# Patient Record
Sex: MALE | Race: WHITE | NOT HISPANIC OR LATINO | Employment: OTHER | URBAN - METROPOLITAN AREA
[De-identification: names, ages, dates, MRNs, and addresses within clinical notes are randomized per-mention and may not be internally consistent; named-entity substitution may affect disease eponyms.]

---

## 2017-03-10 DIAGNOSIS — E11.9 TYPE 2 DIABETES MELLITUS WITHOUT COMPLICATION: ICD-10-CM

## 2017-04-07 DIAGNOSIS — E11.9 TYPE 2 DIABETES MELLITUS WITHOUT COMPLICATION: ICD-10-CM

## 2017-06-25 ENCOUNTER — HOSPITAL ENCOUNTER (INPATIENT)
Facility: HOSPITAL | Age: 77
LOS: 2 days | Discharge: HOME | DRG: 065 | End: 2017-06-27
Attending: EMERGENCY MEDICINE | Admitting: SPECIALIST
Payer: MEDICARE

## 2017-06-25 DIAGNOSIS — R53.1 ACUTE LEFT-SIDED WEAKNESS: Primary | ICD-10-CM

## 2017-06-25 DIAGNOSIS — R53.1 WEAKNESS: ICD-10-CM

## 2017-06-25 DIAGNOSIS — I63.9 ACUTE CVA (CEREBROVASCULAR ACCIDENT): ICD-10-CM

## 2017-06-25 DIAGNOSIS — I63.50 CEREBRAL INFARCTION DUE TO UNSPECIFIED OCCLUSION OR STENOSIS OF UNSPECIFIED CEREBRAL ARTERY: ICD-10-CM

## 2017-06-25 LAB
ALBUMIN SERPL BCP-MCNC: 3.8 G/DL
ALP SERPL-CCNC: 109 U/L
ALT SERPL W/O P-5'-P-CCNC: 13 U/L
ANION GAP SERPL CALC-SCNC: 9 MMOL/L
APTT BLDCRRT: 29.1 SEC
AST SERPL-CCNC: 13 U/L
BASOPHILS # BLD AUTO: 0.04 K/UL
BASOPHILS NFR BLD: 0.8 %
BILIRUB SERPL-MCNC: 0.5 MG/DL
BILIRUB UR QL STRIP: NEGATIVE
BUN SERPL-MCNC: 20 MG/DL
CALCIUM SERPL-MCNC: 9.1 MG/DL
CHLORIDE SERPL-SCNC: 106 MMOL/L
CLARITY UR: CLEAR
CO2 SERPL-SCNC: 26 MMOL/L
COLOR UR: YELLOW
CREAT SERPL-MCNC: 1.3 MG/DL
DIFFERENTIAL METHOD: ABNORMAL
EOSINOPHIL # BLD AUTO: 0.3 K/UL
EOSINOPHIL NFR BLD: 5.9 %
ERYTHROCYTE [DISTWIDTH] IN BLOOD BY AUTOMATED COUNT: 13.1 %
EST. GFR  (AFRICAN AMERICAN): >60 ML/MIN/1.73 M^2
EST. GFR  (NON AFRICAN AMERICAN): 52 ML/MIN/1.73 M^2
GLUCOSE SERPL-MCNC: 250 MG/DL
GLUCOSE UR QL STRIP: ABNORMAL
HCT VFR BLD AUTO: 37.8 %
HGB BLD-MCNC: 13.2 G/DL
HGB UR QL STRIP: NEGATIVE
INR PPP: 1
KETONES UR QL STRIP: NEGATIVE
LEUKOCYTE ESTERASE UR QL STRIP: NEGATIVE
LYMPHOCYTES # BLD AUTO: 1 K/UL
LYMPHOCYTES NFR BLD: 19.7 %
MAGNESIUM SERPL-MCNC: 2 MG/DL
MCH RBC QN AUTO: 30.4 PG
MCHC RBC AUTO-ENTMCNC: 34.9 %
MCV RBC AUTO: 87 FL
MONOCYTES # BLD AUTO: 0.6 K/UL
MONOCYTES NFR BLD: 10.4 %
NEUTROPHILS # BLD AUTO: 3.3 K/UL
NEUTROPHILS NFR BLD: 63.2 %
NITRITE UR QL STRIP: NEGATIVE
PH UR STRIP: 7 [PH] (ref 5–8)
PHOSPHATE SERPL-MCNC: 2.8 MG/DL
PLATELET # BLD AUTO: 222 K/UL
PMV BLD AUTO: 9.7 FL
POTASSIUM SERPL-SCNC: 4.3 MMOL/L
PROT SERPL-MCNC: 7.3 G/DL
PROT UR QL STRIP: NEGATIVE
PROTHROMBIN TIME: 10.6 SEC
RBC # BLD AUTO: 4.34 M/UL
SODIUM SERPL-SCNC: 141 MMOL/L
SP GR UR STRIP: 1.01 (ref 1–1.03)
TROPONIN I SERPL DL<=0.01 NG/ML-MCNC: 0.01 NG/ML
TROPONIN I SERPL DL<=0.01 NG/ML-MCNC: 0.03 NG/ML
URN SPEC COLLECT METH UR: ABNORMAL
UROBILINOGEN UR STRIP-ACNC: NEGATIVE EU/DL
WBC # BLD AUTO: 5.28 K/UL

## 2017-06-25 PROCEDURE — 36415 COLL VENOUS BLD VENIPUNCTURE: CPT

## 2017-06-25 PROCEDURE — 83036 HEMOGLOBIN GLYCOSYLATED A1C: CPT

## 2017-06-25 PROCEDURE — 94640 AIRWAY INHALATION TREATMENT: CPT

## 2017-06-25 PROCEDURE — 25000003 PHARM REV CODE 250: Performed by: NURSE PRACTITIONER

## 2017-06-25 PROCEDURE — 85025 COMPLETE CBC W/AUTO DIFF WBC: CPT

## 2017-06-25 PROCEDURE — 93010 ELECTROCARDIOGRAM REPORT: CPT | Mod: S$GLB,,, | Performed by: INTERNAL MEDICINE

## 2017-06-25 PROCEDURE — 93005 ELECTROCARDIOGRAM TRACING: CPT

## 2017-06-25 PROCEDURE — 85610 PROTHROMBIN TIME: CPT

## 2017-06-25 PROCEDURE — 84100 ASSAY OF PHOSPHORUS: CPT

## 2017-06-25 PROCEDURE — 83735 ASSAY OF MAGNESIUM: CPT

## 2017-06-25 PROCEDURE — 63600175 PHARM REV CODE 636 W HCPCS: Performed by: NURSE PRACTITIONER

## 2017-06-25 PROCEDURE — 25000003 PHARM REV CODE 250: Performed by: EMERGENCY MEDICINE

## 2017-06-25 PROCEDURE — 81003 URINALYSIS AUTO W/O SCOPE: CPT

## 2017-06-25 PROCEDURE — 99285 EMERGENCY DEPT VISIT HI MDM: CPT | Mod: 25

## 2017-06-25 PROCEDURE — 85730 THROMBOPLASTIN TIME PARTIAL: CPT

## 2017-06-25 PROCEDURE — 80053 COMPREHEN METABOLIC PANEL: CPT

## 2017-06-25 PROCEDURE — 84484 ASSAY OF TROPONIN QUANT: CPT | Mod: 91

## 2017-06-25 PROCEDURE — 84484 ASSAY OF TROPONIN QUANT: CPT

## 2017-06-25 PROCEDURE — 11000001 HC ACUTE MED/SURG PRIVATE ROOM

## 2017-06-25 RX ORDER — CLONAZEPAM 1 MG/1
1 TABLET ORAL NIGHTLY
Status: DISCONTINUED | OUTPATIENT
Start: 2017-06-25 | End: 2017-06-25

## 2017-06-25 RX ORDER — MIRTAZAPINE 15 MG/1
15 TABLET, FILM COATED ORAL NIGHTLY
COMMUNITY

## 2017-06-25 RX ORDER — ASPIRIN 81 MG/1
81 TABLET ORAL DAILY
Status: DISCONTINUED | OUTPATIENT
Start: 2017-06-26 | End: 2017-06-27 | Stop reason: HOSPADM

## 2017-06-25 RX ORDER — INSULIN ASPART 100 [IU]/ML
0-5 INJECTION, SOLUTION INTRAVENOUS; SUBCUTANEOUS
Status: DISCONTINUED | OUTPATIENT
Start: 2017-06-25 | End: 2017-06-27 | Stop reason: HOSPADM

## 2017-06-25 RX ORDER — METHOCARBAMOL 750 MG/1
750 TABLET, FILM COATED ORAL NIGHTLY
COMMUNITY

## 2017-06-25 RX ORDER — LOSARTAN POTASSIUM 50 MG/1
100 TABLET ORAL DAILY
Status: DISCONTINUED | OUTPATIENT
Start: 2017-06-26 | End: 2017-06-26

## 2017-06-25 RX ORDER — ACETAMINOPHEN 325 MG/1
650 TABLET ORAL EVERY 6 HOURS PRN
Status: DISCONTINUED | OUTPATIENT
Start: 2017-06-25 | End: 2017-06-27 | Stop reason: HOSPADM

## 2017-06-25 RX ORDER — IBUPROFEN 200 MG
24 TABLET ORAL
Status: DISCONTINUED | OUTPATIENT
Start: 2017-06-25 | End: 2017-06-27 | Stop reason: HOSPADM

## 2017-06-25 RX ORDER — AMLODIPINE BESYLATE 5 MG/1
5 TABLET ORAL DAILY
Status: DISCONTINUED | OUTPATIENT
Start: 2017-06-26 | End: 2017-06-26

## 2017-06-25 RX ORDER — HEPARIN SODIUM 5000 [USP'U]/ML
5000 INJECTION, SOLUTION INTRAVENOUS; SUBCUTANEOUS EVERY 8 HOURS
Status: DISCONTINUED | OUTPATIENT
Start: 2017-06-25 | End: 2017-06-27 | Stop reason: HOSPADM

## 2017-06-25 RX ORDER — IBUPROFEN 200 MG
16 TABLET ORAL
Status: DISCONTINUED | OUTPATIENT
Start: 2017-06-25 | End: 2017-06-27 | Stop reason: HOSPADM

## 2017-06-25 RX ORDER — GLUCAGON 1 MG
1 KIT INJECTION
Status: DISCONTINUED | OUTPATIENT
Start: 2017-06-25 | End: 2017-06-27 | Stop reason: HOSPADM

## 2017-06-25 RX ORDER — GUAIFENESIN 600 MG/1
600 TABLET, EXTENDED RELEASE ORAL 2 TIMES DAILY
Status: DISCONTINUED | OUTPATIENT
Start: 2017-06-25 | End: 2017-06-27 | Stop reason: HOSPADM

## 2017-06-25 RX ORDER — IPRATROPIUM BROMIDE AND ALBUTEROL SULFATE 2.5; .5 MG/3ML; MG/3ML
3 SOLUTION RESPIRATORY (INHALATION) EVERY 4 HOURS PRN
Status: DISCONTINUED | OUTPATIENT
Start: 2017-06-25 | End: 2017-06-27 | Stop reason: HOSPADM

## 2017-06-25 RX ORDER — ARFORMOTEROL TARTRATE 15 UG/2ML
15 SOLUTION RESPIRATORY (INHALATION) 2 TIMES DAILY
Status: DISCONTINUED | OUTPATIENT
Start: 2017-06-25 | End: 2017-06-27 | Stop reason: HOSPADM

## 2017-06-25 RX ADMIN — ARFORMOTEROL TARTRATE 15 MCG: 15 SOLUTION RESPIRATORY (INHALATION) at 07:06

## 2017-06-25 RX ADMIN — HEPARIN SODIUM 5000 UNITS: 5000 INJECTION, SOLUTION INTRAVENOUS; SUBCUTANEOUS at 09:06

## 2017-06-25 RX ADMIN — PANTOPRAZOLE SODIUM 600 MG: 40 TABLET, DELAYED RELEASE ORAL at 09:06

## 2017-06-25 RX ADMIN — ACETAMINOPHEN 650 MG: 325 TABLET ORAL at 09:06

## 2017-06-25 RX ADMIN — HUMAN INSULIN 20 UNITS: 100 INJECTION, SUSPENSION SUBCUTANEOUS at 09:06

## 2017-06-25 NOTE — ED NOTES
Pt resting in ER stretcher, aaox4, rr e/u, NAD noted. Pt remains on cardiac monitor with vss noted. Bed low and locked, call light in reach, side rails up x2. Friend at bedside.  Provided with urinal for specimen collection.  Pt verbalized understanding of status and POC; denies further needs. Will continue to monitor.

## 2017-06-25 NOTE — PROGRESS NOTES
Pt arrived in stable condition accompanied by nurse.  Bedside report given.  Patient IV patent.  Pt has no complaints at this time.  Heart monitor put on.  Will cont to monitor.

## 2017-06-25 NOTE — ASSESSMENT & PLAN NOTE
Neuro check Q 4 hours.   CT of head showed no acute intracranial hemorrhage or focal brain parenchymal abnormality is identified.  MRI of head   History of Right side CVA   Check 2 D Echo   Bilateral Carotid U/S  Consult Neurology

## 2017-06-25 NOTE — H&P
"[facility] Hospital Medicine  History & Physical    Patient Name: Mikael Cancino  MRN: [id number]  Admission Date: 6/25/2017  Attending Physician: Dilan Porras Jr., MD   Primary Care Provider: BLANCA GIRON MD         Patient information was obtained from patient and ER records.     Subjective:     Principal Problem:Acute left-sided weakness    Chief Complaint:   Chief Complaint   Patient presents with    Weakness     Pt states, "I fell because my left leg went weak and couldn't hold my weight."        HPI: Mr Cancino is a 76 year old male with PMHx of COPD, CVA in 7/2015 with Rt side weakness,  Asbestosis, HTN, DM, former smoker and intolerance to Statins (constipation). He presented to [facility] Emergency Room with complaints left side weakness. Patient fell while getting up out of bed this morning about 9:30 am. Pt states his LLE felt "weak and gave out". He fell on his right hip.   Denies associated symptoms of chest pain, increase shortness of breath, dizziness, palpitations, nausea, vomiting or diuresis.  CT of head showed atrophy present.  Old right basal ganglia infarct.  No acute intracranial hemorrhage or focal brain parenchymal abnormality is identified, calvarium is intact. Rt hip X ray showed no acute bony or joint abnormality. EKG today: Normal sinus rhythm.    Past Medical History:   Diagnosis Date    Allergy     Asbestosis     Asthma     COPD (chronic obstructive pulmonary disease)     Diabetes mellitus     Diabetes mellitus, type 2     Hypertension     Neuropathic pain     Stroke 07/2015       Past Surgical History:   Procedure Laterality Date    BACK SURGERY      CATARACT EXTRACTION W/ INTRAOCULAR LENS IMPLANT         Review of patient's allergies indicates:   Allergen Reactions    Cholesterol analogues Other (See Comments)     constapation    Latex, natural rubber Blisters    Statins-hmg-coa reductase inhibitors      constipation       No current " facility-administered medications on file prior to encounter.      Current Outpatient Prescriptions on File Prior to Encounter   Medication Sig    albuterol (PROVENTIL) 2.5 mg /3 mL (0.083 %) nebulizer solution Take 3 mLs (2.5 mg total) by nebulization every 4 (four) hours as needed for Shortness of Breath.    amlodipine (NORVASC) 10 MG tablet Take 10 mg by mouth once daily.     aspirin (ECOTRIN) 81 MG EC tablet Take 1 tablet (81 mg total) by mouth once daily.    budesonide-formoterol 160-4.5 mcg (SYMBICORT) 160-4.5 mcg/actuation HFAA Inhale 2 puffs into the lungs every 12 (twelve) hours. Wash out mouth after using    gabapentin (NEURONTIN) 300 MG capsule Take 600 mg by mouth 3 (three) times daily. 2 tablets by mouth 3 times daily    insulin NPH (NOVOLIN N) 100 unit/mL injection 45 units daily    losartan (COZAAR) 100 MG tablet Take 100 mg by mouth every evening.     tiotropium (SPIRIVA) 18 mcg inhalation capsule Inhale 1 capsule (18 mcg total) into the lungs Daily.    acetaminophen (TYLENOL) 500 mg Cap     atorvastatin (LIPITOR) 40 MG tablet Take 1 tablet (40 mg total) by mouth once daily. Call PCP for refills (Patient taking differently: Take 80 mg by mouth once daily. Call PCP for refills)    benzonatate (TESSALON) 100 MG capsule Take 1 capsule (100 mg total) by mouth 3 (three) times daily as needed for Cough.    clonazepam (KLONOPIN) 1 MG tablet Take 1 mg by mouth every evening.    fluticasone (FLONASE) 50 mcg/actuation nasal spray 2 sprays by Nasal route.    insulin aspart (NOVOLOG) 100 unit/mL injection 5 units  Sq  Before dinner    metformin (GLUCOPHAGE) 500 MG tablet Take 500 mg by mouth once daily.     Family History     Problem Relation (Age of Onset)    Cancer Sister    Emphysema Sister    Heart failure Mother        Social History Main Topics    Smoking status: Former Smoker     Packs/day: 1.00     Types: Cigarettes     Quit date: 2/5/1998    Smokeless tobacco: Never Used    Alcohol use  0.0 oz/week      Comment: Beer but very seldom    Drug use: No    Sexual activity: Yes     Birth control/ protection: None     Review of Systems   Constitutional: Negative for activity change, appetite change, chills, fatigue and fever.   HENT: Negative for congestion, postnasal drip and rhinorrhea.    Respiratory: Positive for shortness of breath (chronic). Negative for apnea, cough, choking, chest tightness, wheezing and stridor.    Cardiovascular: Negative for chest pain, palpitations and leg swelling.   Gastrointestinal: Negative for abdominal distention, diarrhea and nausea.   Genitourinary: Negative.    Musculoskeletal:        Left leg weakness   Rt hip pain    Skin: Negative for color change, pallor, rash and wound.   Neurological: Positive for weakness (Left leg ). Negative for dizziness, seizures, syncope, speech difficulty, light-headedness and headaches.   Psychiatric/Behavioral: Negative for agitation, behavioral problems and suicidal ideas.     Objective:     Vital Signs (Most Recent):  Temp: 97.7 °F (36.5 °C) (06/25/17 1522)  Pulse: (!) 57 (06/25/17 1522)  Resp: 14 (06/25/17 1522)  BP: (!) 157/84 (06/25/17 1525)  SpO2: 98 % (06/25/17 1522) Vital Signs (24h Range):  Temp:  [97.6 °F (36.4 °C)-97.7 °F (36.5 °C)] 97.7 °F (36.5 °C)  Pulse:  [56-77] 57  Resp:  [12-20] 14  SpO2:  [96 %-100 %] 98 %  BP: (154-168)/(66-84) 157/84     Weight: 88 kg (194 lb)  Body mass index is 27.06 kg/m².    Physical Exam   Constitutional: He is oriented to person, place, and time. He appears well-developed and well-nourished.   HENT:   Head: Normocephalic and atraumatic.   Eyes: Right eye exhibits no discharge. Left eye exhibits no discharge.   Neck: No JVD present.   Cardiovascular: Exam reveals no gallop and no friction rub.    No murmur heard.  Pulmonary/Chest: No respiratory distress. He has rales (faint rales Lt posterior side ). He exhibits no tenderness.   Abdominal: He exhibits no distension.   Musculoskeletal: He  exhibits no edema, tenderness or deformity.   Mild leg leg weakness   Neurological: He is alert and oriented to person, place, and time.   Skin: Skin is warm and dry.   Nursing note and vitals reviewed.       Significant Labs:   CBC:   Recent Labs  Lab 06/25/17  1245   WBC 5.28   HGB 13.2*   HCT 37.8*        CMP:   Recent Labs  Lab 06/25/17  1245      K 4.3      CO2 26   *   BUN 20   CREATININE 1.3   CALCIUM 9.1   PROT 7.3   ALBUMIN 3.8   BILITOT 0.5   ALKPHOS 109   AST 13   ALT 13   ANIONGAP 9   EGFRNONAA 52*     Imaging Results          MRI Brain Without Contrast (In process)                X-Ray Hip 2 or 3 views Right (Final result)  Result time 06/25/17 15:37:07    Final result by Nicolasa Garcia MD (Timothy) (06/25/17 15:37:07)                 Impression:         No acute bony changes      Electronically signed by: NICOLASA GARCIA MD  Date:     06/25/17  Time:    15:37              Narrative:    Right hip, 3 views    Clinical History:  Right hip pain after fall    Findings:     There is  no acute bony or joint abnormality. No acute fracture or dislocation.                             CT Head Without Contrast (Final result)  Result time 06/25/17 13:29:57    Final result by Nicolasa Garcia MD (Timothy) (06/25/17 13:29:57)                 Impression:         No acute intracranial abnormality    all ct exams at this facility use dose modulation, iterative reconstruction, and /or weight based dosing to reduce radiation dose to low as reasonably achievable.      Electronically signed by: NICOLASA GARCIA MD  Date:     06/25/17  Time:    13:29              Narrative:    Exam: Noncontrast CT head    History:    Weakness    Findings: Atrophy is present.  Old right basal ganglia infarct.  No acute intracranial hemorrhage or focal brain parenchymal abnormality is identified. Calvarium is intact.     No change compared to 07/23/2015.                            Assessment/Plan:     * Acute left-sided  weakness    Neuro check Q 4 hours.   CT of head showed no acute intracranial hemorrhage or focal brain parenchymal abnormality is identified.  MRI of head   History of Right side CVA   Check 2 D Echo   Bilateral Carotid U/S  Consult Neurology             Diabetes mellitus    Accu check ACHS, low dose siding scale  Restart NPH 30 units QHS   HA1C           Chronic obstructive pulmonary disease    Duo Nebs Q 4 hours PRN, SOB or wheezing   Mucinex 600 mg BID   Start Brovana, he is on Spiriva at home             Essential (primary) hypertension    Restart home medications             VTE Risk Mitigation         Ordered     heparin (porcine) injection 5,000 Units  Every 8 hours     Route:  Subcutaneous        06/25/17 1425     Medium Risk of VTE  Once      06/25/17 1604        Luis Fernando Clifton NP  Department of Hospital Medicine   [facility]     I have seen and examined the patient at bedside. I reviewed the notes, assessments, and/or procedures performed by Luis Fernando Clifton, I concur with his documentation of Mikael Cancino.    Patient with hx of CVA, now admitted with left sided weakness.   Pt was on asa at home.   MRI, carotid ultrasound and echo with bubble study ordered.   Neurology consulted.   Pt/OT.   Supportive care.     Dilan Porras MD

## 2017-06-25 NOTE — SUBJECTIVE & OBJECTIVE
Past Medical History:   Diagnosis Date    Allergy     Asbestosis     Asthma     COPD (chronic obstructive pulmonary disease)     Diabetes mellitus     Diabetes mellitus, type 2     Hypertension     Neuropathic pain     Stroke 07/2015       Past Surgical History:   Procedure Laterality Date    BACK SURGERY      CATARACT EXTRACTION W/ INTRAOCULAR LENS IMPLANT         Review of patient's allergies indicates:   Allergen Reactions    Cholesterol analogues Other (See Comments)     constapation    Latex, natural rubber Blisters    Statins-hmg-coa reductase inhibitors      constipation       No current facility-administered medications on file prior to encounter.      Current Outpatient Prescriptions on File Prior to Encounter   Medication Sig    albuterol (PROVENTIL) 2.5 mg /3 mL (0.083 %) nebulizer solution Take 3 mLs (2.5 mg total) by nebulization every 4 (four) hours as needed for Shortness of Breath.    amlodipine (NORVASC) 10 MG tablet Take 10 mg by mouth once daily.     aspirin (ECOTRIN) 81 MG EC tablet Take 1 tablet (81 mg total) by mouth once daily.    budesonide-formoterol 160-4.5 mcg (SYMBICORT) 160-4.5 mcg/actuation HFAA Inhale 2 puffs into the lungs every 12 (twelve) hours. Wash out mouth after using    gabapentin (NEURONTIN) 300 MG capsule Take 600 mg by mouth 3 (three) times daily. 2 tablets by mouth 3 times daily    insulin NPH (NOVOLIN N) 100 unit/mL injection 45 units daily    losartan (COZAAR) 100 MG tablet Take 100 mg by mouth every evening.     tiotropium (SPIRIVA) 18 mcg inhalation capsule Inhale 1 capsule (18 mcg total) into the lungs Daily.    acetaminophen (TYLENOL) 500 mg Cap     atorvastatin (LIPITOR) 40 MG tablet Take 1 tablet (40 mg total) by mouth once daily. Call PCP for refills (Patient taking differently: Take 80 mg by mouth once daily. Call PCP for refills)    benzonatate (TESSALON) 100 MG capsule Take 1 capsule (100 mg total) by mouth 3 (three) times daily as  needed for Cough.    clonazepam (KLONOPIN) 1 MG tablet Take 1 mg by mouth every evening.    fluticasone (FLONASE) 50 mcg/actuation nasal spray 2 sprays by Nasal route.    insulin aspart (NOVOLOG) 100 unit/mL injection 5 units  Sq  Before dinner    metformin (GLUCOPHAGE) 500 MG tablet Take 500 mg by mouth once daily.     Family History     Problem Relation (Age of Onset)    Cancer Sister    Emphysema Sister    Heart failure Mother        Social History Main Topics    Smoking status: Former Smoker     Packs/day: 1.00     Types: Cigarettes     Quit date: 2/5/1998    Smokeless tobacco: Never Used    Alcohol use 0.0 oz/week      Comment: Beer but very seldom    Drug use: No    Sexual activity: Yes     Birth control/ protection: None     Review of Systems   Constitutional: Negative for activity change, appetite change, chills, fatigue and fever.   HENT: Negative for congestion, postnasal drip and rhinorrhea.    Respiratory: Positive for shortness of breath (chronic). Negative for apnea, cough, choking, chest tightness, wheezing and stridor.    Cardiovascular: Negative for chest pain, palpitations and leg swelling.   Gastrointestinal: Negative for abdominal distention, diarrhea and nausea.   Genitourinary: Negative.    Musculoskeletal:        Left leg weakness   Rt hip pain    Skin: Negative for color change, pallor, rash and wound.   Neurological: Positive for weakness (Left leg ). Negative for dizziness, seizures, syncope, speech difficulty, light-headedness and headaches.   Psychiatric/Behavioral: Negative for agitation, behavioral problems and suicidal ideas.     Objective:     Vital Signs (Most Recent):  Temp: 97.7 °F (36.5 °C) (06/25/17 1522)  Pulse: (!) 57 (06/25/17 1522)  Resp: 14 (06/25/17 1522)  BP: (!) 157/84 (06/25/17 1525)  SpO2: 98 % (06/25/17 1522) Vital Signs (24h Range):  Temp:  [97.6 °F (36.4 °C)-97.7 °F (36.5 °C)] 97.7 °F (36.5 °C)  Pulse:  [56-77] 57  Resp:  [12-20] 14  SpO2:  [96 %-100 %] 98  %  BP: (154-168)/(66-84) 157/84     Weight: 88 kg (194 lb)  Body mass index is 27.06 kg/m².    Physical Exam   Constitutional: He is oriented to person, place, and time. He appears well-developed and well-nourished.   HENT:   Head: Normocephalic and atraumatic.   Eyes: Right eye exhibits no discharge. Left eye exhibits no discharge.   Neck: No JVD present.   Cardiovascular: Exam reveals no gallop and no friction rub.    No murmur heard.  Pulmonary/Chest: No respiratory distress. He has rales (faint rales Lt posterior side ). He exhibits no tenderness.   Abdominal: He exhibits no distension.   Musculoskeletal: He exhibits no edema, tenderness or deformity.   Mild leg leg weakness   Neurological: He is alert and oriented to person, place, and time.   Skin: Skin is warm and dry.   Nursing note and vitals reviewed.       Significant Labs:   CBC:   Recent Labs  Lab 06/25/17  1245   WBC 5.28   HGB 13.2*   HCT 37.8*        CMP:   Recent Labs  Lab 06/25/17  1245      K 4.3      CO2 26   *   BUN 20   CREATININE 1.3   CALCIUM 9.1   PROT 7.3   ALBUMIN 3.8   BILITOT 0.5   ALKPHOS 109   AST 13   ALT 13   ANIONGAP 9   EGFRNONAA 52*     Imaging Results          MRI Brain Without Contrast (In process)                X-Ray Hip 2 or 3 views Right (Final result)  Result time 06/25/17 15:37:07    Final result by Nicolasa Garcia MD (Timothy) (06/25/17 15:37:07)                 Impression:         No acute bony changes      Electronically signed by: NICOLASA GARCIA MD  Date:     06/25/17  Time:    15:37              Narrative:    Right hip, 3 views    Clinical History:  Right hip pain after fall    Findings:     There is  no acute bony or joint abnormality. No acute fracture or dislocation.                             CT Head Without Contrast (Final result)  Result time 06/25/17 13:29:57    Final result by Nicolasa Garcia MD (Timothy) (06/25/17 13:29:57)                 Impression:         No acute intracranial  abnormality    all ct exams at this facility use dose modulation, iterative reconstruction, and /or weight based dosing to reduce radiation dose to low as reasonably achievable.      Electronically signed by: LUBNA GARCIA MD  Date:     06/25/17  Time:    13:29              Narrative:    Exam: Noncontrast CT head    History:    Weakness    Findings: Atrophy is present.  Old right basal ganglia infarct.  No acute intracranial hemorrhage or focal brain parenchymal abnormality is identified. Calvarium is intact.     No change compared to 07/23/2015.

## 2017-06-25 NOTE — ASSESSMENT & PLAN NOTE
Duo Nebs Q 4 hours PRN, SOB or wheezing   Mucinex 600 mg BID   Start Brovana, he is on Spiriva at home

## 2017-06-25 NOTE — ED PROVIDER NOTES
"SCRIBE #1 NOTE: I, Corinne Woodrow, am scribing for, and in the presence of, David Cobb MD. I have scribed the entire note.      History      Chief Complaint   Patient presents with    Weakness     Pt states, "I fell because my left leg went weak and couldn't hold my weight."       Review of patient's allergies indicates:   Allergen Reactions    Cholesterol analogues Other (See Comments)     constapation    Statins-hmg-coa reductase inhibitors      constipation        HPI   HPI    6/25/2017, 12:23 PM   History obtained from the patient      History of Present Illness: Mikael Cancino is a 76 y.o. male patient who presents to the Emergency Department for weakness which onset gradually. Symptoms are constant and moderate in severity. Pt fell this morning while getting out of bed. Pt states his LLE felt "weak and gave out". Pt's friend reports pt was not feeling well all day yesterday. No mitigating or exacerbating factors reported. No associated sxs reported. Patient denies any fever, chills, CP, palpitations, SOB, N/V, head injury/trauma, HA, dizziness, numbness, LOC, and all other sxs at this time. No prior Tx reported. Pt has Hx of COPD and stroke. No further complaints or concerns at this time.       Arrival mode: Personal vehicle      PCP: BLANCA GIRON MD       Past Medical History:  Past Medical History:   Diagnosis Date    Allergy     Asbestosis     Asthma     COPD (chronic obstructive pulmonary disease)     Diabetes mellitus     Diabetes mellitus, type 2     Hypertension     Neuropathic pain     Stroke 07/2015       Past Surgical History:  Past Surgical History:   Procedure Laterality Date    BACK SURGERY      CATARACT EXTRACTION W/ INTRAOCULAR LENS IMPLANT           Family History:  Family History   Problem Relation Age of Onset    Emphysema Sister     Cancer Sister     Heart failure Mother        Social History:  Social History     Social History Main Topics    Smoking status: " Former Smoker     Packs/day: 1.00     Types: Cigarettes     Quit date: 2/5/1998    Smokeless tobacco: Never Used    Alcohol use 0.0 oz/week      Comment: Beer but very seldom    Drug use: No    Sexual activity: Yes     Birth control/ protection: None       ROS   Review of Systems   Constitutional: Negative for chills and fever.   Respiratory: Negative for cough and shortness of breath.    Cardiovascular: Negative for chest pain and palpitations.   Gastrointestinal: Negative for abdominal pain, diarrhea, nausea and vomiting.   Musculoskeletal: Negative for back pain, neck pain and neck stiffness.        (+) fall  (-) head injury/trauma   Skin: Negative for rash and wound.   Neurological: Positive for weakness (L-sided). Negative for dizziness, light-headedness, numbness and headaches.        (-) LOC   All other systems reviewed and are negative.    Physical Exam      Initial Vitals [06/25/17 1208]   BP Pulse Resp Temp SpO2   (!) 160/78 77 20 97.6 °F (36.4 °C) 96 %      MAP       105.33          Physical Exam  Nursing Notes and Vital Signs Reviewed.  Constitutional: Patient is in no apparent distress. Well-developed and well-nourished.  Head: Atraumatic. Normocephalic.  Eyes: PERRL. EOM intact. Conjunctivae are not pale. No scleral icterus.  ENT: Mucous membranes are moist. Oropharynx is clear and symmetric.    Neck: Supple. Full ROM. No lymphadenopathy.  Cardiovascular: Regular rate. Regular rhythm. No murmurs, rubs, or gallops. Distal pulses are 2+ and symmetric.  Pulmonary/Chest: No respiratory distress. Clear to auscultation bilaterally. No wheezing, rales, or rhonchi.  Abdominal: Soft and non-distended.  There is no tenderness.  No rebound, guarding, or rigidity.   Musculoskeletal: Moves all extremities. No obvious deformities. No edema. No calf tenderness.  Skin: Warm and dry.  Neurological: Patient is alert and oriented to person, place and time. Pupils ERRL and EOM normal. Cranial nerves II-XII are intact.  "Strength is full bilaterally; it is equal and 5/5 in bilateral upper and lower extremities. There is no pronator drift of outstretched arms. Drifting of LLE. Speech is clear and normal. No acute focal neurological deficits noted.  Psychiatric: Normal affect. Good eye contact. Appropriate in content.    ED Course    Procedures  ED Vital Signs:  Vitals:    06/25/17 1208 06/25/17 1216 06/25/17 1217 06/25/17 1302   BP: (!) 160/78 (!) 157/74  (!) 168/70   Pulse: 77 66 62 61   Resp: 20  12 13   Temp: 97.6 °F (36.4 °C)      TempSrc: Oral      SpO2: 96%  99% 99%   Weight: 88 kg (194 lb)      Height: 5' 11" (1.803 m)       06/25/17 1402   BP: (!) 154/66   Pulse: (!) 59   Resp: 13   Temp:    TempSrc:    SpO2: 100%   Weight:    Height:        Abnormal Lab Results:  Labs Reviewed   CBC W/ AUTO DIFFERENTIAL - Abnormal; Notable for the following:        Result Value    RBC 4.34 (*)     Hemoglobin 13.2 (*)     Hematocrit 37.8 (*)     All other components within normal limits   COMPREHENSIVE METABOLIC PANEL - Abnormal; Notable for the following:     Glucose 250 (*)     eGFR if non  52 (*)     All other components within normal limits   URINALYSIS - Abnormal; Notable for the following:     Glucose, UA 2+ (*)     All other components within normal limits   TROPONIN I - Abnormal; Notable for the following:     Troponin I 0.031 (*)     All other components within normal limits   PROTIME-INR   APTT   MAGNESIUM   PHOSPHORUS        All Lab Results:  Results for orders placed or performed during the hospital encounter of 06/25/17   CBC auto differential   Result Value Ref Range    WBC 5.28 3.90 - 12.70 K/uL    RBC 4.34 (L) 4.60 - 6.20 M/uL    Hemoglobin 13.2 (L) 14.0 - 18.0 g/dL    Hematocrit 37.8 (L) 40.0 - 54.0 %    MCV 87 82 - 98 fL    MCH 30.4 27.0 - 31.0 pg    MCHC 34.9 32.0 - 36.0 %    RDW 13.1 11.5 - 14.5 %    Platelets 222 150 - 350 K/uL    MPV 9.7 9.2 - 12.9 fL    Gran # 3.3 1.8 - 7.7 K/uL    Lymph # 1.0 1.0 - 4.8 " K/uL    Mono # 0.6 0.3 - 1.0 K/uL    Eos # 0.3 0.0 - 0.5 K/uL    Baso # 0.04 0.00 - 0.20 K/uL    Gran% 63.2 38.0 - 73.0 %    Lymph% 19.7 18.0 - 48.0 %    Mono% 10.4 4.0 - 15.0 %    Eosinophil% 5.9 0.0 - 8.0 %    Basophil% 0.8 0.0 - 1.9 %    Differential Method Automated    Comprehensive metabolic panel   Result Value Ref Range    Sodium 141 136 - 145 mmol/L    Potassium 4.3 3.5 - 5.1 mmol/L    Chloride 106 95 - 110 mmol/L    CO2 26 23 - 29 mmol/L    Glucose 250 (H) 70 - 110 mg/dL    BUN, Bld 20 8 - 23 mg/dL    Creatinine 1.3 0.5 - 1.4 mg/dL    Calcium 9.1 8.7 - 10.5 mg/dL    Total Protein 7.3 6.0 - 8.4 g/dL    Albumin 3.8 3.5 - 5.2 g/dL    Total Bilirubin 0.5 0.1 - 1.0 mg/dL    Alkaline Phosphatase 109 55 - 135 U/L    AST 13 10 - 40 U/L    ALT 13 10 - 44 U/L    Anion Gap 9 8 - 16 mmol/L    eGFR if African American >60 >60 mL/min/1.73 m^2    eGFR if non African American 52 (A) >60 mL/min/1.73 m^2   Protime-INR   Result Value Ref Range    Prothrombin Time 10.6 9.0 - 12.5 sec    INR 1.0 0.8 - 1.2   APTT   Result Value Ref Range    aPTT 29.1 21.0 - 32.0 sec   Urinalysis   Result Value Ref Range    Specimen UA Urine, Clean Catch     Color, UA Yellow Yellow, Straw, Shelley    Appearance, UA Clear Clear    pH, UA 7.0 5.0 - 8.0    Specific Gravity, UA 1.015 1.005 - 1.030    Protein, UA Negative Negative    Glucose, UA 2+ (A) Negative    Ketones, UA Negative Negative    Bilirubin (UA) Negative Negative    Occult Blood UA Negative Negative    Nitrite, UA Negative Negative    Urobilinogen, UA Negative <2.0 EU/dL    Leukocytes, UA Negative Negative   Troponin I   Result Value Ref Range    Troponin I 0.031 (H) 0.000 - 0.026 ng/mL   Magnesium   Result Value Ref Range    Magnesium 2.0 1.6 - 2.6 mg/dL   Phosphorus   Result Value Ref Range    Phosphorus 2.8 2.7 - 4.5 mg/dL     Imaging Results:  Imaging Results          CT Head Without Contrast (Final result)  Result time 06/25/17 13:29:57    Final result by Nicolasa Esparza (Timothy),  MD (06/25/17 13:29:57)                 Impression:         No acute intracranial abnormality    all ct exams at this facility use dose modulation, iterative reconstruction, and /or weight based dosing to reduce radiation dose to low as reasonably achievable.      Electronically signed by: LUBNA GARCIA MD  Date:     06/25/17  Time:    13:29              Narrative:    Exam: Noncontrast CT head    History:    Weakness    Findings: Atrophy is present.  Old right basal ganglia infarct.  No acute intracranial hemorrhage or focal brain parenchymal abnormality is identified. Calvarium is intact.     No change compared to 07/23/2015.                             The EKG was ordered, reviewed, and independently interpreted by the ED provider.  Interpretation time: 1223  Rate: 61 BPM  Rhythm: normal sinus rhythm  Interpretation: Normal ECG. No STEMI.           The Emergency Provider reviewed the vital signs and test results, which are outlined above.    ED Discussion     2:14 PM: Discussed case with Britni Pryor NP ([facility]). Britni Pryor NP agrees with current care and management of pt and accepts admission.   Admitting Service: [facility]   Admitting Physician: Dr. Porras  Admit to: Tele    2:20 PM: Re-evaluated pt. I have discussed test results, shared treatment plan, and the need for admission with patient. Pt expresses understanding at this time and agree with all information. All questions answered. Pt has no further questions or concerns at this time. Pt is ready for admit.      ED Medication(s):  Medications   heparin (porcine) injection 5,000 Units (not administered)       Current Discharge Medication List                Medical Decision Making    Medical Decision Making:   Clinical Tests:   Lab Tests: Reviewed and Ordered  Radiological Study: Reviewed and Ordered  Medical Tests: Reviewed and Ordered           Scribe Attestation:   Scribe #1: I performed the above scribed service and the  documentation accurately describes the services I performed. I attest to the accuracy of the note.    Attending:   Physician Attestation Statement for Scribe #1: I, David Cobb MD, personally performed the services described in this documentation, as scribed by Corinne Mack, in my presence, and it is both accurate and complete.          Clinical Impression       ICD-10-CM ICD-9-CM   1. Acute left-sided weakness M62.89 728.87   2. Weakness R53.1 780.79       Disposition:   Disposition: Placed in Observation  Condition: Fair         David Cobb MD  06/25/17 8059

## 2017-06-25 NOTE — HPI
"Mr Cancino is a 76 year old male with PMHx of COPD, CVA in 7/2015 with Rt side weakness,  Asbestosis, HTN, DM, former smoker and intolerance to Statins (constipation). He presented to [facility] Emergency Room with complaints left side weakness. Patient fell while getting up out of bed this morning about 9:30 am. Pt states his LLE felt "weak and gave out". He fell on his right hip.   Denies associated symptoms of chest pain, increase shortness of breath, dizziness, palpitations, nausea, vomiting or diuresis.  CT of head showed atrophy present.  Old right basal ganglia infarct.  No acute intracranial hemorrhage or focal brain parenchymal abnormality is identified, calvarium is intact. Rt hip X ray showed no acute bony or joint abnormality. EKG today: Normal sinus rhythm.  "

## 2017-06-26 LAB
ESTIMATED AVG GLUCOSE: 214 MG/DL
ESTIMATED PA SYSTOLIC PRESSURE: 16.84
HBA1C MFR BLD HPLC: 9.1 %
POCT GLUCOSE: 123 MG/DL (ref 70–110)
POCT GLUCOSE: 217 MG/DL (ref 70–110)
POCT GLUCOSE: 232 MG/DL (ref 70–110)
POCT GLUCOSE: 376 MG/DL (ref 70–110)
RETIRED EF AND QEF - SEE NOTES: 60 (ref 55–65)
TROPONIN I SERPL DL<=0.01 NG/ML-MCNC: 0.02 NG/ML

## 2017-06-26 PROCEDURE — 25000003 PHARM REV CODE 250: Performed by: NURSE PRACTITIONER

## 2017-06-26 PROCEDURE — 82607 VITAMIN B-12: CPT

## 2017-06-26 PROCEDURE — G8989 SELF CARE D/C STATUS: HCPCS | Mod: CJ

## 2017-06-26 PROCEDURE — 96372 THER/PROPH/DIAG INJ SC/IM: CPT

## 2017-06-26 PROCEDURE — 93306 TTE W/DOPPLER COMPLETE: CPT

## 2017-06-26 PROCEDURE — G8979 MOBILITY GOAL STATUS: HCPCS | Mod: CI

## 2017-06-26 PROCEDURE — 84484 ASSAY OF TROPONIN QUANT: CPT

## 2017-06-26 PROCEDURE — 93306 TTE W/DOPPLER COMPLETE: CPT | Mod: 26,,, | Performed by: INTERNAL MEDICINE

## 2017-06-26 PROCEDURE — 97165 OT EVAL LOW COMPLEX 30 MIN: CPT

## 2017-06-26 PROCEDURE — 21400001 HC TELEMETRY ROOM

## 2017-06-26 PROCEDURE — G8987 SELF CARE CURRENT STATUS: HCPCS | Mod: CJ

## 2017-06-26 PROCEDURE — G8978 MOBILITY CURRENT STATUS: HCPCS | Mod: CK

## 2017-06-26 PROCEDURE — 83921 ORGANIC ACID SINGLE QUANT: CPT

## 2017-06-26 PROCEDURE — 25000003 PHARM REV CODE 250: Performed by: EMERGENCY MEDICINE

## 2017-06-26 PROCEDURE — G8988 SELF CARE GOAL STATUS: HCPCS | Mod: CJ

## 2017-06-26 PROCEDURE — 25000003 PHARM REV CODE 250: Performed by: FAMILY MEDICINE

## 2017-06-26 PROCEDURE — 63600175 PHARM REV CODE 636 W HCPCS: Performed by: NURSE PRACTITIONER

## 2017-06-26 PROCEDURE — 97116 GAIT TRAINING THERAPY: CPT

## 2017-06-26 PROCEDURE — 36415 COLL VENOUS BLD VENIPUNCTURE: CPT

## 2017-06-26 PROCEDURE — 94640 AIRWAY INHALATION TREATMENT: CPT

## 2017-06-26 PROCEDURE — 97162 PT EVAL MOD COMPLEX 30 MIN: CPT

## 2017-06-26 PROCEDURE — 97535 SELF CARE MNGMENT TRAINING: CPT

## 2017-06-26 RX ORDER — GABAPENTIN 300 MG/1
300 CAPSULE ORAL ONCE
Status: COMPLETED | OUTPATIENT
Start: 2017-06-26 | End: 2017-06-26

## 2017-06-26 RX ORDER — GABAPENTIN 300 MG/1
600 CAPSULE ORAL ONCE
Status: COMPLETED | OUTPATIENT
Start: 2017-06-26 | End: 2017-06-26

## 2017-06-26 RX ORDER — GABAPENTIN 300 MG/1
300 CAPSULE ORAL 3 TIMES DAILY
Status: DISCONTINUED | OUTPATIENT
Start: 2017-06-26 | End: 2017-06-27 | Stop reason: HOSPADM

## 2017-06-26 RX ADMIN — HEPARIN SODIUM 5000 UNITS: 5000 INJECTION, SOLUTION INTRAVENOUS; SUBCUTANEOUS at 05:06

## 2017-06-26 RX ADMIN — HUMAN INSULIN 20 UNITS: 100 INJECTION, SUSPENSION SUBCUTANEOUS at 09:06

## 2017-06-26 RX ADMIN — GABAPENTIN 300 MG: 300 CAPSULE ORAL at 09:06

## 2017-06-26 RX ADMIN — ASPIRIN 81 MG: 81 TABLET, COATED ORAL at 08:06

## 2017-06-26 RX ADMIN — INSULIN ASPART 2 UNITS: 100 INJECTION, SOLUTION INTRAVENOUS; SUBCUTANEOUS at 04:06

## 2017-06-26 RX ADMIN — ARFORMOTEROL TARTRATE 15 MCG: 15 SOLUTION RESPIRATORY (INHALATION) at 07:06

## 2017-06-26 RX ADMIN — PANTOPRAZOLE SODIUM 600 MG: 40 TABLET, DELAYED RELEASE ORAL at 08:06

## 2017-06-26 RX ADMIN — AMLODIPINE BESYLATE 5 MG: 5 TABLET ORAL at 08:06

## 2017-06-26 RX ADMIN — HEPARIN SODIUM 5000 UNITS: 5000 INJECTION, SOLUTION INTRAVENOUS; SUBCUTANEOUS at 03:06

## 2017-06-26 RX ADMIN — LOSARTAN POTASSIUM 100 MG: 50 TABLET, FILM COATED ORAL at 08:06

## 2017-06-26 RX ADMIN — INSULIN ASPART 1 UNITS: 100 INJECTION, SOLUTION INTRAVENOUS; SUBCUTANEOUS at 09:06

## 2017-06-26 RX ADMIN — GABAPENTIN 300 MG: 300 CAPSULE ORAL at 04:06

## 2017-06-26 RX ADMIN — PANTOPRAZOLE SODIUM 600 MG: 40 TABLET, DELAYED RELEASE ORAL at 09:06

## 2017-06-26 RX ADMIN — HEPARIN SODIUM 5000 UNITS: 5000 INJECTION, SOLUTION INTRAVENOUS; SUBCUTANEOUS at 09:06

## 2017-06-26 RX ADMIN — INSULIN ASPART 5 UNITS: 100 INJECTION, SOLUTION INTRAVENOUS; SUBCUTANEOUS at 11:06

## 2017-06-26 RX ADMIN — GABAPENTIN 600 MG: 300 CAPSULE ORAL at 05:06

## 2017-06-26 NOTE — ASSESSMENT & PLAN NOTE
- Hold home Metformin  - A1c 9.1 (reports average BG is 300-400 at home)  - Accu check ACHS, low dose siding scale  - Continue home NPH, decrease dose to 20 units QHS   - ADA diet  - Resume home Neurontin for neuropathy  - Monitor

## 2017-06-26 NOTE — PROGRESS NOTES
"[facility] Hospital Medicine  Progress Note    Patient Name: Mikael Cancino  MRN: [id number]  Patient Class: IP- Inpatient   Admission Date: 6/25/2017  Length of Stay: 1 days  Attending Physician: Dilan Porras Jr., MD  Primary Care Provider: BLANCA GIRON MD        Subjective:     Principal Problem:Acute CVA (cerebrovascular accident)    HPI:  Mr Cancino is a 76 year old male with PMHx of COPD, CVA in 7/2015 with Rt side weakness,  Asbestosis, HTN, DM, former smoker and intolerance to Statins (constipation). He presented to [facility] Emergency Room with complaints left side weakness. Patient fell while getting up out of bed this morning about 9:30 am. Pt states his LLE felt "weak and gave out". He fell on his right hip.   Denies associated symptoms of chest pain, increase shortness of breath, dizziness, palpitations, nausea, vomiting or diuresis.  CT of head showed atrophy present.  Old right basal ganglia infarct.  No acute intracranial hemorrhage or focal brain parenchymal abnormality is identified, calvarium is intact. Rt hip X ray showed no acute bony or joint abnormality. EKG today: Normal sinus rhythm.    Hospital Course:  6/26/17- No acute events overnight.  Family present at .  Patient with h/o CVA in July of 2015 with residual right sided involvement approximately 2 years ago.  Patient noticed LLE weakness around 0930 Sunday AM after falling out of bed when attempting to stand.  He reports taking ASA 81 mg po daily.  Has never taken Plavix.  PT/OT has evaluated the patient and is recommending outpatient rehab.  MRI here showed an acute infarct in the posterior basal ganglia on the right side extending to the deep white matter adjacent to the right lateral ventricle.  No evidence of a mass or bleed.  Evaluated by Dr. Ortega.  Recommending to continue baby ASA, does not need full strength dose.  Also recommends holding BP meds for 72 hours.     Interval History: 6/26/17- No acute " events overnight.  Family present at BS.  Patient with h/o CVA in July of 2015 with residual right sided involvement approximately 2 years ago.  Patient noticed LLE weakness around 0930 Sunday AM after falling out of bed when attempting to stand.  He reports taking ASA 81 mg po daily.  Has never taken Plavix.  PT/OT has evaluated the patient and is recommending outpatient rehab.  MRI here showed an acute infarct in the posterior basal ganglia on the right side extending to the deep white matter adjacent to the right lateral ventricle.  No evidence of a mass or bleed.  Evaluated by Dr. Ortega.  Recommending to continue baby ASA, does not need full strength dose.  Also recommends holding BP meds for 72 hours.     Review of Systems   Constitutional: Negative for chills, diaphoresis, fatigue and fever.   HENT: Negative for hearing loss, mouth sores, sore throat, tinnitus and trouble swallowing.    Eyes: Negative for pain, discharge and redness.   Respiratory: Negative for apnea, cough, choking, chest tightness, shortness of breath, wheezing and stridor.    Cardiovascular: Negative for chest pain, palpitations and leg swelling.   Gastrointestinal: Negative for abdominal distention, abdominal pain, blood in stool, constipation, diarrhea, nausea, rectal pain and vomiting.   Endocrine: Negative for cold intolerance, heat intolerance, polydipsia, polyphagia and polyuria.   Genitourinary: Negative for difficulty urinating, dysuria, flank pain, frequency, hematuria and urgency.   Musculoskeletal: Negative for arthralgias, back pain, gait problem, joint swelling, neck pain and neck stiffness.   Skin: Negative for color change, rash and wound.   Allergic/Immunologic: Negative for food allergies.   Neurological: Positive for weakness. Negative for dizziness, tremors, seizures, syncope, speech difficulty, light-headedness and headaches.   Hematological: Negative for adenopathy. Does not bruise/bleed easily.    Psychiatric/Behavioral: Negative for agitation and confusion. The patient is not nervous/anxious.    All other systems reviewed and are negative.    Objective:     Vital Signs (Most Recent):  Temp: 97.8 °F (36.6 °C) (06/26/17 1555)  Pulse: (!) 58 (06/26/17 1555)  Resp: 18 (06/26/17 0845)  BP: 130/73 (06/26/17 1555)  SpO2: 97 % (06/26/17 1555) Vital Signs (24h Range):  Temp:  [97.8 °F (36.6 °C)-98.5 °F (36.9 °C)] 97.8 °F (36.6 °C)  Pulse:  [54-66] 58  Resp:  [16-18] 18  SpO2:  [95 %-99 %] 97 %  BP: (124-150)/(59-80) 130/73     Weight: 88 kg (194 lb)  Body mass index is 27.06 kg/m².    Intake/Output Summary (Last 24 hours) at 06/26/17 1606  Last data filed at 06/26/17 0900   Gross per 24 hour   Intake              200 ml   Output              650 ml   Net             -450 ml      Physical Exam   Constitutional: He is oriented to person, place, and time. He appears well-developed and well-nourished. No distress.   HENT:   Head: Normocephalic and atraumatic.   Mouth/Throat: Oropharynx is clear and moist.   Eyes: Conjunctivae and EOM are normal. Pupils are equal, round, and reactive to light.   Neck: Normal range of motion. Neck supple. No JVD present.   Cardiovascular: Normal rate, regular rhythm, normal heart sounds and intact distal pulses.  Exam reveals no gallop and no friction rub.    No murmur heard.  Pulmonary/Chest: Effort normal and breath sounds normal. No stridor. No respiratory distress. He has no wheezes. He has no rales. He exhibits no tenderness.   Abdominal: Soft. Bowel sounds are normal. He exhibits no distension and no mass. There is no tenderness. There is no rebound and no guarding.   Musculoskeletal: Normal range of motion. He exhibits no edema or tenderness.   Neurological: He is alert and oriented to person, place, and time. No cranial nerve deficit. GCS eye subscore is 4. GCS verbal subscore is 5. GCS motor subscore is 6.   Mild Left sided weakness.  Left hand  4/5.  Right hand  5/5.   LLE strength 3-4/5, RLE strength 5/5. Speech clear. Responds appropriately.   Skin: Skin is warm and dry. No rash noted. He is not diaphoretic. No erythema.   Psychiatric: He has a normal mood and affect. His behavior is normal. Judgment and thought content normal.   Nursing note and vitals reviewed.      Significant Labs:   CBC:   Recent Labs  Lab 06/25/17  1245   WBC 5.28   HGB 13.2*   HCT 37.8*        CMP:   Recent Labs  Lab 06/25/17  1245      K 4.3      CO2 26   *   BUN 20   CREATININE 1.3   CALCIUM 9.1   PROT 7.3   ALBUMIN 3.8   BILITOT 0.5   ALKPHOS 109   AST 13   ALT 13   ANIONGAP 9   EGFRNONAA 52*       Significant Imaging: I have reviewed all pertinent imaging results/findings within the past 24 hours.   Imaging Results          US Carotid Bilateral (Final result)  Result time 06/25/17 18:05:24    Final result by Glenn Richardson MD (06/25/17 18:05:24)                 Impression:    Less than 50% stenosis right ICA. Less than 20% stenosis left ICA.        Electronically signed by: GLENN RICHARDSON MD  Date:     06/25/17  Time:    18:05              Narrative:    Bilateral carotid Doppler ultrasound with duplex and color flow imaging, 06/25/17 17:55:12    History:   Lt sided weakness, fall    The right common carotid artery bifurcation reveals mild plaque. Peak systolic velocity in the right ICA is 132 cm/s. The right ICA/CCA systolic ratio is 1.5. Diastolic ratio is 2.7.    The left common carotid artery bifurcation reveals mild plaque. The peak systolic velocity left ICA is 103 cm/s. The systolic velocity ratio is 1.4 and diastolic ratio 1.6.      There is normal antegrade vertebral artery flow bilaterally.    Validated velocity measurements are extrapolated from diameter data as defined by the Society of Radiologists in Ultrasound Conference Radiology 2003; 229;340-346.                             MRI Brain Without Contrast (Final result)  Result time 06/26/17 08:19:28    Final  result by Mikael Mercado MD (06/26/17 08:19:28)                 Impression:        Multiple old lacunar infarcts in the basal ganglia bilaterally and deep white matter of both cerebral hemispheres.  Old lacunar infarct involves the left caudate head.  There an acute infarct in the posterior basal ganglia on the right side extending to the deep white matter adjacent to the right lateral ventricle.  No evidence of a mass or bleed.      Electronically signed by: MIKAEL MERCADO MD  Date:     06/26/17  Time:    08:19              Narrative:    MRI of the brain without contrast.    Date: 06/25/17 15:33:20    History:  r/o CVA, new right sided weakness    Standard multiplanar noncontrast sequences of the brain.    Acute right posterior basal ganglia and deep white matter lacunar infarct.  Small bilateral hippocampal cysts.  Multiple small old appearing lacunar infarcts in the basal ganglia and deep white matter bilaterally including the left caudate head.  No evidence of a mass or bleed.  Scattered small vessel disease.                             X-Ray Hip 2 or 3 views Right (Final result)  Result time 06/25/17 15:37:07    Final result by Nicolasa Garcia MD (Timothy) (06/25/17 15:37:07)                 Impression:         No acute bony changes      Electronically signed by: NICOLASA GARCIA MD  Date:     06/25/17  Time:    15:37              Narrative:    Right hip, 3 views    Clinical History:  Right hip pain after fall    Findings:     There is  no acute bony or joint abnormality. No acute fracture or dislocation.                             CT Head Without Contrast (Final result)  Result time 06/25/17 13:29:57    Final result by Nicolasa Garcia MD (Timothy) (06/25/17 13:29:57)                 Impression:         No acute intracranial abnormality    all ct exams at this facility use dose modulation, iterative reconstruction, and /or weight based dosing to reduce radiation dose to low as reasonably  achievable.      Electronically signed by: LUBNA GARCIA MD  Date:     06/25/17  Time:    13:29              Narrative:    Exam: Noncontrast CT head    History:    Weakness    Findings: Atrophy is present.  Old right basal ganglia infarct.  No acute intracranial hemorrhage or focal brain parenchymal abnormality is identified. Calvarium is intact.     No change compared to 07/23/2015.                            Assessment/Plan:      * Acute CVA (cerebrovascular accident)    - H/O CVA in July of 2015 with mild residual Right side weakness  - Neuro consulted, spoke with Dr. Ortega who recommends to continue with daily baby ASA, does not need full strength ASA, or Plavix  - Refusing Statin secondary to constipation  - PT/OT evaluated (recommending outpatient rehab as he does not qualify for inpatient)  - Carotid US showed Less than 50% stenosis Right ICA. Less than 20% stenosis Left ICA  - ECHO pending  - Neuro checks q 4 hours  - Hold home BP meds x 72 hours  - Monitor  - Anticipate DC home with family in AM          Essential (primary) hypertension    - Hold home medications to allow permissive HTN x 72 hours  - Monitor          Diabetes mellitus    - Hold home Metformin  - A1c 9.1 (reports average BG is 300-400 at home)  - Accu check ACHS, low dose siding scale  - Continue home NPH, decrease dose to 20 units QHS   - ADA diet  - Resume home Neurontin for neuropathy  - Monitor          Chronic obstructive pulmonary disease    - Duo Nebs Q 4 hours PRN, SOB or wheezing   - Mucinex 600 mg BID   - Start Brovana, he is on Spiriva at home   - Monitor                        VTE Risk Mitigation         Ordered     heparin (porcine) injection 5,000 Units  Every 8 hours     Route:  Subcutaneous        06/25/17 1425     Medium Risk of VTE  Once      06/25/17 1604          Dianna Pennington NP  Department of Hospital Medicine   [facility]

## 2017-06-26 NOTE — PLAN OF CARE
Met with patient and his son, Mikael Cancino . Patient stated that he had outpatient therapy at [facility] after his last stroke and would prefer outpatient tx instead of HH PT. Discussed different levels of care and rationale for inpatient therapy not being recommended. He verbalized understanding.      06/26/17 1450   Discharge Assessment   Assessment Type Discharge Planning Assessment   Confirmed/corrected address and phone number on facesheet? Yes   Assessment information obtained from? Patient;Medical Record   Expected Length of Stay (days) (2)   Communicated expected length of stay with patient/caregiver yes   Prior to hospitilization cognitive status: Alert/Oriented   Prior to hospitalization functional status: Independent   Current cognitive status: Alert/Oriented   Current Functional Status: Independent   Arrived From home or self-care   Lives With alone   Able to Return to Prior Arrangements yes   Is patient able to care for self after discharge? Yes   Patient's perception of discharge disposition home or selfcare  (Requests outpatient therapy as opposed to HH PT.)   Readmission Within The Last 30 Days no previous admission in last 30 days   Patient currently being followed by outpatient case management? No   Patient currently receives home health services? No   Does the patient currently use HME? No   Do you have any problems affording any of your prescribed medications? No   Is the patient taking medications as prescribed? no   If no, which medications is patient not taking? (See neurology consult. )   Do you have any financial concerns preventing you from receiving the healthcare you need? No   Does the patient have transportation to healthcare appointments? Yes   Transportation Available car;family or friend will provide   On Dialysis? No   Discharge Plan A Home   Discharge Plan B Other  (Outpatient therapy, previously used [facility])   Patient/Family In Agreement With Plan yes

## 2017-06-26 NOTE — SUBJECTIVE & OBJECTIVE
Interval History: 6/26/17- No acute events overnight.  Family present at .  Patient with h/o CVA in July of 2015 with residual right sided involvement approximately 2 years ago.  Patient noticed LLE weakness around 0930 Sunday AM after falling out of bed when attempting to stand.  He reports taking ASA 81 mg po daily.  Has never taken Plavix.  PT/OT has evaluated the patient and is recommending outpatient rehab.  MRI here showed an acute infarct in the posterior basal ganglia on the right side extending to the deep white matter adjacent to the right lateral ventricle.  No evidence of a mass or bleed.  Evaluated by Dr. Ortega.  Recommending to continue baby ASA, does not need full strength dose.  Also recommends holding BP meds for 72 hours.     Review of Systems   Constitutional: Negative for chills, diaphoresis, fatigue and fever.   HENT: Negative for hearing loss, mouth sores, sore throat, tinnitus and trouble swallowing.    Eyes: Negative for pain, discharge and redness.   Respiratory: Negative for apnea, cough, choking, chest tightness, shortness of breath, wheezing and stridor.    Cardiovascular: Negative for chest pain, palpitations and leg swelling.   Gastrointestinal: Negative for abdominal distention, abdominal pain, blood in stool, constipation, diarrhea, nausea, rectal pain and vomiting.   Endocrine: Negative for cold intolerance, heat intolerance, polydipsia, polyphagia and polyuria.   Genitourinary: Negative for difficulty urinating, dysuria, flank pain, frequency, hematuria and urgency.   Musculoskeletal: Negative for arthralgias, back pain, gait problem, joint swelling, neck pain and neck stiffness.   Skin: Negative for color change, rash and wound.   Allergic/Immunologic: Negative for food allergies.   Neurological: Positive for weakness. Negative for dizziness, tremors, seizures, syncope, speech difficulty, light-headedness and headaches.   Hematological: Negative for adenopathy. Does not  bruise/bleed easily.   Psychiatric/Behavioral: Negative for agitation and confusion. The patient is not nervous/anxious.    All other systems reviewed and are negative.    Objective:     Vital Signs (Most Recent):  Temp: 97.8 °F (36.6 °C) (06/26/17 1555)  Pulse: (!) 58 (06/26/17 1555)  Resp: 18 (06/26/17 0845)  BP: 130/73 (06/26/17 1555)  SpO2: 97 % (06/26/17 1555) Vital Signs (24h Range):  Temp:  [97.8 °F (36.6 °C)-98.5 °F (36.9 °C)] 97.8 °F (36.6 °C)  Pulse:  [54-66] 58  Resp:  [16-18] 18  SpO2:  [95 %-99 %] 97 %  BP: (124-150)/(59-80) 130/73     Weight: 88 kg (194 lb)  Body mass index is 27.06 kg/m².    Intake/Output Summary (Last 24 hours) at 06/26/17 1606  Last data filed at 06/26/17 0900   Gross per 24 hour   Intake              200 ml   Output              650 ml   Net             -450 ml      Physical Exam   Constitutional: He is oriented to person, place, and time. He appears well-developed and well-nourished. No distress.   HENT:   Head: Normocephalic and atraumatic.   Mouth/Throat: Oropharynx is clear and moist.   Eyes: Conjunctivae and EOM are normal. Pupils are equal, round, and reactive to light.   Neck: Normal range of motion. Neck supple. No JVD present.   Cardiovascular: Normal rate, regular rhythm, normal heart sounds and intact distal pulses.  Exam reveals no gallop and no friction rub.    No murmur heard.  Pulmonary/Chest: Effort normal and breath sounds normal. No stridor. No respiratory distress. He has no wheezes. He has no rales. He exhibits no tenderness.   Abdominal: Soft. Bowel sounds are normal. He exhibits no distension and no mass. There is no tenderness. There is no rebound and no guarding.   Musculoskeletal: Normal range of motion. He exhibits no edema or tenderness.   Neurological: He is alert and oriented to person, place, and time. No cranial nerve deficit. GCS eye subscore is 4. GCS verbal subscore is 5. GCS motor subscore is 6.   Mild Left sided weakness.  Left hand  4/5.   Right hand  5/5.  LLE strength 3-4/5, RLE strength 5/5. Speech clear. Responds appropriately.   Skin: Skin is warm and dry. No rash noted. He is not diaphoretic. No erythema.   Psychiatric: He has a normal mood and affect. His behavior is normal. Judgment and thought content normal.   Nursing note and vitals reviewed.      Significant Labs:   CBC:   Recent Labs  Lab 06/25/17  1245   WBC 5.28   HGB 13.2*   HCT 37.8*        CMP:   Recent Labs  Lab 06/25/17  1245      K 4.3      CO2 26   *   BUN 20   CREATININE 1.3   CALCIUM 9.1   PROT 7.3   ALBUMIN 3.8   BILITOT 0.5   ALKPHOS 109   AST 13   ALT 13   ANIONGAP 9   EGFRNONAA 52*       Significant Imaging: I have reviewed all pertinent imaging results/findings within the past 24 hours.   Imaging Results          US Carotid Bilateral (Final result)  Result time 06/25/17 18:05:24    Final result by Glenn Richardson MD (06/25/17 18:05:24)                 Impression:    Less than 50% stenosis right ICA. Less than 20% stenosis left ICA.        Electronically signed by: GLENN RICHARDSON MD  Date:     06/25/17  Time:    18:05              Narrative:    Bilateral carotid Doppler ultrasound with duplex and color flow imaging, 06/25/17 17:55:12    History:   Lt sided weakness, fall    The right common carotid artery bifurcation reveals mild plaque. Peak systolic velocity in the right ICA is 132 cm/s. The right ICA/CCA systolic ratio is 1.5. Diastolic ratio is 2.7.    The left common carotid artery bifurcation reveals mild plaque. The peak systolic velocity left ICA is 103 cm/s. The systolic velocity ratio is 1.4 and diastolic ratio 1.6.      There is normal antegrade vertebral artery flow bilaterally.    Validated velocity measurements are extrapolated from diameter data as defined by the Society of Radiologists in Ultrasound Conference Radiology 2003; 229;340-346.                             MRI Brain Without Contrast (Final result)  Result time  06/26/17 08:19:28    Final result by Mikael Mercado MD (06/26/17 08:19:28)                 Impression:        Multiple old lacunar infarcts in the basal ganglia bilaterally and deep white matter of both cerebral hemispheres.  Old lacunar infarct involves the left caudate head.  There an acute infarct in the posterior basal ganglia on the right side extending to the deep white matter adjacent to the right lateral ventricle.  No evidence of a mass or bleed.      Electronically signed by: MIKAEL MERCADO MD  Date:     06/26/17  Time:    08:19              Narrative:    MRI of the brain without contrast.    Date: 06/25/17 15:33:20    History:  r/o CVA, new right sided weakness    Standard multiplanar noncontrast sequences of the brain.    Acute right posterior basal ganglia and deep white matter lacunar infarct.  Small bilateral hippocampal cysts.  Multiple small old appearing lacunar infarcts in the basal ganglia and deep white matter bilaterally including the left caudate head.  No evidence of a mass or bleed.  Scattered small vessel disease.                             X-Ray Hip 2 or 3 views Right (Final result)  Result time 06/25/17 15:37:07    Final result by Nicolasa Garcia MD (Timothy) (06/25/17 15:37:07)                 Impression:         No acute bony changes      Electronically signed by: NICOLASA GARCIA MD  Date:     06/25/17  Time:    15:37              Narrative:    Right hip, 3 views    Clinical History:  Right hip pain after fall    Findings:     There is  no acute bony or joint abnormality. No acute fracture or dislocation.                             CT Head Without Contrast (Final result)  Result time 06/25/17 13:29:57    Final result by Nicolasa Garcia MD (Timothy) (06/25/17 13:29:57)                 Impression:         No acute intracranial abnormality    all ct exams at this facility use dose modulation, iterative reconstruction, and /or weight based dosing to reduce radiation dose to low as reasonably  achievable.      Electronically signed by: LUBNA GARCIA MD  Date:     06/25/17  Time:    13:29              Narrative:    Exam: Noncontrast CT head    History:    Weakness    Findings: Atrophy is present.  Old right basal ganglia infarct.  No acute intracranial hemorrhage or focal brain parenchymal abnormality is identified. Calvarium is intact.     No change compared to 07/23/2015.

## 2017-06-26 NOTE — PROGRESS NOTES
Pt transferred to telemetry unit room 246; report given and RHONA Serrano assumed care; pt currently stable

## 2017-06-26 NOTE — PLAN OF CARE
Problem: Patient Care Overview  Goal: Plan of Care Review  Outcome: Ongoing (interventions implemented as appropriate)  Patient had uneventful day.  CT, MRI, Echo, and Cartid US done today.  Patient troponin slightly bumped.  Repeat at 8:00pm.  Patient has uneventful day.  Goal: Individualization & Mutuality  Outcome: Ongoing (interventions implemented as appropriate)  Dx: Acute CVA  Hx: DM, HTN, COPD, CVA

## 2017-06-26 NOTE — PT/OT/SLP EVAL
Occupational Therapy  Evaluation    Mikael Cancino   MRN: [id number]   Admitting Diagnosis: Acute CVA (cerebrovascular accident)    OT Date of Treatment: 06/26/17   OT Start Time: 1530  OT Stop Time: 1602  OT Total Time (min): 32 min    Billable Minutes:  Evaluation 15 minutes  Self Care/Home Management 15 minutes    Diagnosis: Acute CVA (cerebrovascular accident)       Past Medical History:   Diagnosis Date    Allergy     Asbestosis     Asthma     COPD (chronic obstructive pulmonary disease)     Diabetes mellitus     Diabetes mellitus, type 2     Hypertension     Neuropathic pain     Stroke 07/2015      Past Surgical History:   Procedure Laterality Date    BACK SURGERY      CATARACT EXTRACTION W/ INTRAOCULAR LENS IMPLANT         Referring physician: dr. patel  Date referred to OT: 6-26-17    General Precautions: Standard, fall  Orthopedic Precautions: N/A  Braces:            Patient History:  Living Environment  Lives With: alone  Living Arrangements: house  Home Layout: Able to live on 1st floor  Stair Railings at Home: none  Living Environment Comment: pt lives alone but living with son upon discharge  Equipment Currently Used at Home: none    Prior level of function:   Bed Mobility/Transfers: independent  Grooming: independent  Bathing: independent  Upper Body Dressing: independent  Lower Body Dressing: independent  Toileting: independent  Home Management Skills: independent  Driving License: Yes  Mode of Transportation: Motorcycle, Car  Occupation: Retired     Dominant hand: right    Subjective:  Communicated with Nurse and epic chart review prior to session.    Chief Complaint: instability with functional mobility and t/f's  Patient/Family stated goals:     Pain/Comfort  Pain Rating 1: 0/10  Location - Side 1: Right  Location - Orientation 1: generalized    Objective:  Patient found with: telemetry    Cognitive Exam:  Oriented to: Person, Place, Time and Situation  Follows Commands/attention: Follows  multistep  commands  Communication: clear/fluent  Memory:  No Deficits noted  Safety awareness/insight to disability: intact  Coping skills/emotional control: Appropriate to situation    Visual/perceptual:  Intact    Physical Exam:  Postural examination/scapula alignment: unable to accurately assess  Skin integrity: Visible skin intact and Thin  Edema: None noted     Sensation:   Intact    Upper Extremity Range of Motion:  Right Upper Extremity: WFL  Left Upper Extremity: WFL    Upper Extremity Strength:  Right Upper Extremity: WFL  Left Upper Extremity: mmt: 4-/5 grossly.pt reported plof   Strength: wfl    Fine motor coordination:   Intact    Gross motor coordination: WFL    Functional Mobility:  Bed Mobility:  Rolling/Turning to Left: Supervision  Rolling/Turning Right: Supervision  Scooting/Bridging: Supervision  Supine to Sit: Supervision  Sit to Supine: Supervision    Transfers:  Sit <> Stand Assistance: Stand By Assistance  Sit <> Stand Assistive Device: No Assistive Device    Functional Ambulation: pt req cga with functional mobility x 25 feet x 1 standing rest break    Activities of Daily Living:     Feeding adaptive equipment: na  UE Dressing Level of Assistance: Set-up Assistance  UE adaptive equipment: na  LE Dressing Level of Assistance: Supervision  LE adaptive equipment: na        Toileting Where Assessed: Toilet  Toileting Level of Assistance: Modified independent        Bathing adaptive equipment: na    Balance:   Static Sit: NORMAL: No deviations seen in posture held statically in supported chair  Dynamic Sit: GOOD+: Maintains balance through MAXIMAL excursions of active trunk motion supported chair only  Static Stand: FAIR+: Takes MINIMAL challenges from all directions  Dynamic stand: FAIR: Needs CONTACT GUARD during gait    Therapeutic Activities and Exercises:      AM-PAC 6 CLICK ADL  How much help from another person does this patient currently need?  1 = Unable, Total/Dependent  "Assistance    2 = A lot, Maximum/Moderate Assistance  3 = A little, Minimum/Contact Guard/Supervision  4 = None, Modified [place]/Independent    Putting on and taking off regular lower body clothing? : 4  Bathing (including washing, rinsing, drying)?: 4 (seated at sinkside)  Toileting, which includes using toilet, bedpan, or urinal? : 4  Putting on and taking off regular upper body clothing?: 4  Taking care of personal grooming such as brushing teeth?: 4  Eating meals?: 4  Total Score: 24    AM-PAC Raw Score CMS "G-Code Modifier Level of Impairment Assistance   6 % Total / Unable   7 - 9 CM 80 - 100% Maximal Assist   10-14 CL 60 - 80% Moderate Assist   15 - 19 CK 40 - 60% Moderate Assist   20 - 22 CJ 20 - 40% Minimal Assist   23 CI 1-20% SBA / CGA   24 CH 0% Independent/ Mod I       Patient left supine with all lines intact, call button in reach, nurse mag notified and family present    Assessment:  Mikael Cancino is a 76 y.o. male with a medical diagnosis of Acute CVA (cerebrovascular accident) and presents with impaired functional mobility and t/f's. Pt referred to pt for continue gait training. Pt discharged from skilled O.T. And placed on people 's program.    Rehab identified problem list/impairments:      Rehab potential is good.    Activity tolerance: Good    Discharge recommendations: Discharge Facility/Level Of Care Needs: outpatient PT     Barriers to discharge:      Equipment recommendations: none     GOALS:    Occupational Therapy Goals     Not on file                PLAN:  Patient to be seen   to address the above listed problems via    Plan of Care expires:    Plan of Care reviewed with: patient, son         Camilla De Jesus, OT  06/26/2017  "

## 2017-06-26 NOTE — PT/OT/SLP EVAL
Physical Therapy  Evaluation    Mikael Cancino   MRN: [id number]   Admitting Diagnosis: Acute left-sided weakness    PT Received On: 06/26/17  PT Start Time: 0835     PT Stop Time: 0900    PT Total Time (min): 25 min       Billable Minutes:  Evaluation 15 and Gait [id number]    Diagnosis: Acute left-sided weakness  P.T. TX DX: IMPAIRED FUNCTIONAL MOBILITY    Past Medical History:   Diagnosis Date    Allergy     Asbestosis     Asthma     COPD (chronic obstructive pulmonary disease)     Diabetes mellitus     Diabetes mellitus, type 2     Hypertension     Neuropathic pain     Stroke 07/2015      Past Surgical History:   Procedure Laterality Date    BACK SURGERY      CATARACT EXTRACTION W/ INTRAOCULAR LENS IMPLANT       Referring physician: DR. VILLEGAS  Date referred to PT: 6-25-17    General Precautions: Standard, fall  Orthopedic Precautions: N/A   Braces: N/A       Patient History:  Lives With: alone  Living Arrangements: house  Home Accessibility:  (RAMP TO ENTER)  Home Layout: Able to live on 1st floor  Transportation Available: family or friend will provide, car  Living Environment Comment: PT LIVES ALONE BUT WILL BE STAYING WITH HIS SON FOLLOWING D/C WHO WILL PROVIDE 24 HOUR CARE AS NEEDED, PT FUNCTIONALLY INDEP PTA  Equipment Currently Used at Home: grab bar  DME owned (not currently used): rolling walker, single point cane, wheelchair and ROLLATOR    Previous Level of Function:  Ambulation Skills: independent  Transfer Skills: independent  ADL Skills: independent    Subjective:  Communicated with NURSE CARLSON prior to session.  Pain/Comfort  Pain Rating 1: 5/10  Location - Side 1: Right  Location - Orientation 1: generalized  Location 1: hip (C/O RECENT FALL ONTO HIP AT HOME)    Objective:   Patient found with: telemetry     Cognitive Exam:  Oriented to: Person, Place, Time and Situation    Follows Commands/attention: Follows one-step commands  Communication: clear/fluent  Safety awareness/insight to  disability: impaired    Physical Exam:  Postural examination/scapula alignment: Rounded shoulder    Skin integrity: Visible skin intact  Edema: None noted     Sensation:   Intact BLE, BUE    Upper Extremity Range of Motion:  Right Upper Extremity: WFL  Left Upper Extremity: WFL    Upper Extremity Strength:  Right Upper Extremity: WFL  Left Upper Extremity: WFL    Lower Extremity Range of Motion:  Right Lower Extremity: WFL  Left Lower Extremity: WFL    Lower Extremity Strength:  Right Lower Extremity: GROSSLY 4/5, 3+/5 AT ANKLE  Left Lower Extremity: GROSSLY 4-/5, 3+/5 AT ANKLE     Functional Mobility:  Bed Mobility:  Rolling/Turning to Left: Stand by assistance  Rolling/Turning Right: Stand by assistance  Scooting/Bridging: Stand by Assistance  Supine to Sit: Stand by Assistance    Transfers:  Sit <> Stand Assistance: Contact Guard Assistance  Sit <> Stand Assistive Device: Rolling Walker (PT EDUCATED IN RW USE AND SAFETY DURING TF'S AND GAIT)  Bed <> Chair Technique: Stand Pivot  Bed <> Chair Assistance: Contact Guard Assistance  Bed <> Chair Assistive Device: Rolling Walker    Gait:   Gait Distance: PT AMB 30' X 2 TRIALS WITH RW AND CGA, SLOW PACED GAIT, MILDLY UNSTEADY DUE TO LLE WEAKNESS, MILD BUCKLING TO L KNEE BUT NO GROSS LOB, QUICK TO FATIGUE  Assistance 1: Contact Guard Assistance  Gait Assistive Device: Rolling walker  Gait Pattern: swing-through gait  Gait Deviation(s): decreased joyce, decreased step length, decreased toe-to-floor clearance    Balance:   Static Sit: FAIR  Dynamic Sit: FAIR-, SLIGHT POSTERIOR LEAN WITH ACTIVITY  Static Stand: FAIR-  Dynamic stand: FAIR-    Therapeutic Activities and Exercises:  PT EDUCATED IN AND PERFORMED BLE THEREX X 15 REPS AROM WITH REST.  PT ENCOURAGED TO USE ROLLING WALKER RATHER THAT ROLLATOR WALKER FOR SAFETY    AM-PAC 6 CLICK MOBILITY  How much help from another person does this patient currently need?   1 = Unable, Total/Dependent Assistance  2 = A lot,  Maximum/Moderate Assistance  3 = A little, Minimum/Contact Guard/Supervision  4 = None, Modified [place]/Independent    Turning over in bed (including adjusting bedclothes, sheets and blankets)?: 4  Sitting down on and standing up from a chair with arms (e.g., wheelchair, bedside commode, etc.): 3  Moving from lying on back to sitting on the side of the bed?: 4  Moving to and from a bed to a chair (including a wheelchair)?: 3  Need to walk in hospital room?: 3  Climbing 3-5 steps with a railing?: 1  Total Score: 18     AM-PAC Raw Score CMS G-Code Modifier Level of Impairment Assistance   6 % Total / Unable   7 - 9 CM 80 - 100% Maximal Assist   10 - 14 CL 60 - 80% Moderate Assist   15 - 19 CK 40 - 60% Moderate Assist   20 - 22 CJ 20 - 40% Minimal Assist   23 CI 1-20% SBA / CGA   24 CH 0% Independent/ Mod I     Patient left up in chair with all lines intact, call button in reach, NURSE notified and FAMILY AND NP present.    Assessment:   Mikael Cancino is a 76 y.o. male with a medical diagnosis of Acute left-sided weakness and presents with IMPAIRED FUNCTIONAL MOBILITY. PT WILL BENEFIT FROM CONT. SKILLED P.T. TO ADDRESS IMPAIRMENTS    Rehab identified problem list/impairments: Rehab identified problem list/impairments: weakness, impaired endurance, impaired functional mobilty, gait instability, impaired balance, decreased lower extremity function, decreased safety awareness, decreased coordination    Rehab potential is good.    Activity tolerance: Good    Discharge recommendations: Discharge Facility/Level Of Care Needs: outpatient PT     Barriers to discharge: Barriers to Discharge: Inaccessible home environment    Equipment recommendations: SHOWER CHAIR     GOALS:    Physical Therapy Goals        Problem: Physical Therapy Goal    Goal Priority Disciplines Outcome Goal Variances Interventions   Physical Therapy Goal     PT/OT, PT      Description:  LTG'S TO BE MET IN 7 DAYS (7-3-17)  1. PT WILL BE ALANA  WITH BED MOBILITY  2. PT WILL BE ALANA WITH TF'S  3. PT WILL ' WITH RW AND SBA  4. PT TOLERATE BLE THEREX X 20 REPS AROM  5. PT WILL DEMO G- DYNAMIC BALANCE DURING GAIT  6. PT WILL ASC/DESC. 4 STEPS USING B RAIL AND SBA                    PLAN:    Patient to be seen 5 x/week to address the above listed problems via gait training, therapeutic activities, therapeutic exercises, neuromuscular re-education  Plan of Care expires: 07/03/17  Plan of Care reviewed with: patient, family    PT ENCOURAGED TO CALL FOR ASSISTANCE WITH ALL NEEDS DUE TO FALL RISK STATUS, PT AGREEABLE.  PT ENCOURAGED TO INCREASE TIME OOB IN CHAIR, ALL MEALS IN CHAIR OOB, PT AGREEABLE    Clarisa Jackson, PT  06/26/2017

## 2017-06-26 NOTE — ASSESSMENT & PLAN NOTE
- H/O CVA in July of 2015 with mild residual Right side weakness  - Neuro consulted, spoke with Dr. Ortega who recommends to continue with daily baby ASA, does not need full strength ASA, or Plavix  - Refusing Statin secondary to constipation  - PT/OT evaluated (recommending outpatient rehab as he does not qualify for inpatient)  - Carotid US showed Less than 50% stenosis Right ICA. Less than 20% stenosis Left ICA  - ECHO pending  - Neuro checks q 4 hours  - Hold home BP meds x 72 hours  - Monitor  - Anticipate DC home with family in AM

## 2017-06-26 NOTE — CONSULTS
Consult received    Chart reviewed   Patient assessed  Family at bedside for info  He reports was diagnosed over 20 years ago and has received diabetes education in the past  [facility]  He has a home glucose meter and checks every evening     Recommended he alternate his glucose testing to pre-meals/pre-bedtime snack to obtain a sample of his glucose throughout the day and record for PCP visits on provided log sheets.   He reports averages of 150-250.  He self-adjusts his insulin by 5 units depending on his glucose levels  He is consistent with t aking his insulin     He uses the vial/syringe   Reports correct insulin storage and site selection/rotation  He has good knowledge base on teach back but reinforced info on target glucose values/hyper/hypoglycemia  He is drinking a regular sprite    Instructed to consume only sugar free or zero calorie soft drinks     Have contacted food services to send a sprite zero with meals.  He verbalizes understanding     Literature provided

## 2017-06-26 NOTE — HOSPITAL COURSE
On 6/25/17 patient was admitted to [facility] for new onset Left sided weakness with a h/o of a prior CVA in 2015 with Right side residual.  MRI here showed an acute infarct in the posterior basal ganglia on the right side extending to the deep white matter adjacent to the right lateral ventricle.  No evidence of a mass or bleed.  Evaluated by Dr. Ortega here, who recommends to continue ASA 81 mg po daily.  He does not need full strength ASA or Plavix per their recommendation.  Neuro also recommends holding home BP meds for 72 hours. Patient was counseled extensively to monitor BP at home, and to resume home Norvasc and Losartan on Thursday, 6/29.  He reports that he will check his BP at home and record trends for his PCP.  His A1c here was 9.1.  Patient reports his average BG at home ranges from 300-400 and that he does not comply with an ADA diet.  Instructed to check BG TID and keep a log to be evaluated by his PCP.  He will continue his home regimen of Metformin and Insulin upon DC.  Patient has reported an allergy to Statin drugs and he continues to refuse Statin therapy secondary to constipation. He was counseled on the need to comply with his Statin therapy, and verbalized + understanding but is still refusing.  He has remained stable throughout hospitalization and has worked with PT/OT who are recommending that he continue with outpatient Rehab.  Patient seen and examined and deemed medically stable to DC home today.

## 2017-06-26 NOTE — CONSULTS
INPATIENT   NEUROLOGY CONSULT NOTE  CONSULT WAS NOT CALLED IN  Mikael Cancino   76 y.o. male  Consult Requested By: Dilan Porras Jr., MD  Reason for Consult: Left sided paraparesis  DATE 6/26/2017        History of Present Illness:  This is a 77 yo RHWM with stroke risk factors and non compliance to diabetes management, does not take statin because he believes it causes him constipation.  He mentions that he went to bed on Friday at 10 pm after watching TV with no focal deficits. He wakes up on Sunday morning at 9:30 AM and was unable to ambulate and he fell when he made an attempt to ambulate. He had no other symptoms such as chest pain, no sob, no abdominal pain, no nausea, no vomiting, no dizziness, no dysarthria, no dysphagia, no confusion or disorientation.  He has chronic low back and hip pain, he has decline in cognition, he has symptoms associated with neuropathy.   He has  Sleep apnea but does not want to get treated.       Time of onset of symptoms:  Wake up stroke    Telemedicine was not called in  TPA:  I am speculating because it was a wake up stroke, ED physician may have not called telemedicine or was not a candidate for tPA  NIH Stroke Scale      Interval: 24 hours  Time: 10:00 AM  Person Administering Scale: Ty Ortega MD PhD    Administer stroke scale items in the order listed. Record performance in each category after each subscale exam. Do not go back and change scores. Follow directions provided for each exam technique. Scores should reflect what the patient does, not what the clinician thinks the patient can do. The clinician should record answers while administering the exam and work quickly. Except where indicated, the patient should not be coached (i.e., repeated requests to patient to make a special effort).      1a  Level of consciousness: 0=alert; keenly responsive   1b. LOC questions:  0=Answers both tasks correctly   1c. LOC commands: 0=Answers both tasks correctly   2.   Best Gaze: 0=normal   3.  Visual: 0=No visual loss   4. Facial Palsy: 0=Normal symmetric movement   5a.  Motor left arm: 1=Drift, limb holds 90 (or 45) degrees but drifts down before full 10 seconds: does not hit bed   5b.  Motor right arm: 0=No drift, limb holds 90 (or 45) degrees for full 10 seconds   6a. motor left le=Drift, limb holds 90 (or 45) degrees but drifts down before full 10 seconds: does not hit bed   6b  Motor right le=No drift, limb holds 90 (or 45) degrees for full 10 seconds   7. Limb Ataxia: 1=Present in one limb   8.  Sensory: 0=Normal; no sensory loss   9. Best Language:  0=No aphasia, normal   10. Dysarthria: 0=Normal   11. Extinction and Inattention: 0=No abnormality         Total:   3               STROKE RISK FACTORS:    Renal Failure NO   Hypertension YES   Diabetes YES   Coronary Artery Disease NO   Other Cardiac Disease NO   Hyperlipidemia YES   Insulin Resistance NO   Previous stroke/TIA YES   White Matter disease YES   Smoking NO ( QUIT IN )   Sleep Apnea YES   Family History YES   Migraine headaches NO   Hormonal Supplement NA   Obesity NA   PVD   NA   Atrial Fibrillation NO   Hypercoagulability      NO       Past Medical History:   Diagnosis Date    Allergy     Asbestosis     Asthma     COPD (chronic obstructive pulmonary disease)     Diabetes mellitus     Diabetes mellitus, type 2     Hypertension     Neuropathic pain     Stroke 2015     Past Surgical History:   Procedure Laterality Date    BACK SURGERY      CATARACT EXTRACTION W/ INTRAOCULAR LENS IMPLANT       Family History   Problem Relation Age of Onset    Emphysema Sister     Cancer Sister     Heart failure Mother      Social History   Substance Use Topics    Smoking status: Former Smoker     Packs/day: 1.00     Types: Cigarettes     Quit date: 1998    Smokeless tobacco: Never Used    Alcohol use 0.0 oz/week      Comment: Beer but very seldom       Review of patient's allergies indicates:    Allergen Reactions    Cholesterol analogues Other (See Comments)     constapation    Latex, natural rubber Blisters    Statins-hmg-coa reductase inhibitors      constipation        Scheduled Meds:   amlodipine  5 mg Oral Daily    arformoterol  15 mcg Nebulization BID    aspirin  81 mg Oral Daily    guaifenesin  600 mg Oral BID    heparin (porcine)  5,000 Units Subcutaneous Q8H    insulin NPH  20 Units Subcutaneous QHS    losartan  100 mg Oral Daily     Continuous Infusions:   PRN Meds:acetaminophen, albuterol-ipratropium 2.5mg-0.5mg/3mL, dextrose 50%, dextrose 50%, glucagon (human recombinant), glucose, glucose, insulin aspart    Review of Systems:  All the 14 ROS were reviewed and the pertinent ones are mentioned in the HPI           OBJECTIVE:     Vital Signs (Most Recent)  Temp: 98.5 °F (36.9 °C) (06/26/17 0845)  Pulse: 60 (06/26/17 0845)  Resp: 18 (06/26/17 0845)  BP: 131/80 (06/26/17 0845)  SpO2: 97 % (06/26/17 0845)     Vital Signs Range (Last 24H):  Temp:  [97.6 °F (36.4 °C)-98.5 °F (36.9 °C)]   Pulse:  [54-77]   Resp:  [12-20]   BP: (124-168)/(59-84)   SpO2:  [96 %-100 %]     Physical Exam:  General:  Awake, Alert follows commands.   HEENT:   Acephalic, Atraumatic,  EOMI, PERRLA, no nystagmus, no ptosis, no lid lag, no neglect, no gaze palsy      Neck: supple, no JVD, no Carotid bruit, no torticollis,  Lungs: CTA,  No rhonchi, no rales  Heart: Regular Rate and rhythm, no murmurs, rubs and or gallops  Abdomen, Soft, non tender, non distended, no abdominal  bruit, bowel sounds present  Extremities: No edema,   Musculoskeletal:  Back pain, hip pain  Skin: No rash, no ecchymoses,         NEURO    Mental Status:   Awake, alert   X 4  Memory, Recent and Remote: Very poor  Mood: pleasant  Affect: pleasant and appropriate  Behavior: Appropriate and no disinhibition is  noticed  Attention and Concentration: intact  Insight and thought processes: thought process  Policomental :Negative   Palmomental:   Negative  Frontal release sign: Negative   Higher Executive functions: Significantly compromised  Visual spatial: Compromised  Hallucinations, Delusions and suicidal ideation:  Denies  Language: No syntax errors      SPEECH:   No aphasia, no Dysarthria,     CRANIAL NERVES:      II: visual acuity  Intact   II: visual fields Full to confrontation   II: pupils Equal, round, reactive to light   III,VII: ptosis None   III,IV,VI: extraocular muscles  Full ROM   V: mastication Normal   V: facial light touch sensation  Normal   V,VII: corneal reflex  Present   VII: facial muscle function - upper  Normal   VII: facial muscle function - lower Normal   VIII: hearing Bone conduction >  Ner   IX: soft palate elevation  Normal   IX,X: gag reflex Present   XI: trapezius strength  Intact   XI: sternocleidomastoid strength Intact   XI: neck flexion strength  Intact   XII: tongue strength  Normal         MOTOR:Upper Extremity     Left 5-/5  Proximal and 5/5 distal  Right 5/5 proximal and distal  No PRONATION   DRIFT: positive    TONE:  intact  MUSCLE MASS:  Atrophy of the intrinsics, thenar, hypothenar   REFLEXES: Deep tendon reflexes tested:  Upper extremities:               biceps (C5, C6):2               brachioradialis (C5, C6):2               triceps (C6, C7),0     Lower extremities:                knee or patellar (L2, 3, 4):1               ankle (S1, S2):0      Plantar responses: flexors b/l  Clonus: negative   Muscle Fasciculations: negative     SENSORY  PIN PRICK and TEMP: Diabetic neuropathy  Soft TOUCH:Intact  VIBRATION: Impaired      CEREBELLAR  No dysmetria  No dysdiadochokinesia  No rebound Phenomenon  No Nystagmus  No scanning speech      ROMBERG  Negative   GAIT:  Left sided weakness,     EXTRAPYRAMIDALS:  None      Laboratory:  Lab Results   Component Value Date    WBC 5.28 06/25/2017    HGB 13.2 (L) 06/25/2017    HCT 37.8 (L) 06/25/2017     06/25/2017    CHOL 169 07/24/2015    TRIG 231 (H) 07/24/2015    HDL  26 (L) 07/24/2015    ALT 13 06/25/2017    AST 13 06/25/2017     06/25/2017    K 4.3 06/25/2017     06/25/2017    CREATININE 1.3 06/25/2017    BUN 20 06/25/2017    CO2 26 06/25/2017    TSH 3.22 03/15/2010    PSA 1.4 03/15/2010    INR 1.0 06/25/2017    HGBA1C 8.8 (H) 07/16/2015        Recent Labs  Lab 06/25/17  1315   COLORU Yellow   SPECGRAV 1.015   PHUR 7.0   PROTEINUA Negative         Diagnostic Results:MRI studies, CT scan films   ( All images reviewed Independently)   Imaging Results          US Carotid Bilateral (Final result)  Result time 06/25/17 18:05:24    Final result by Glenn Richardson MD (06/25/17 18:05:24)                 Impression:    Less than 50% stenosis right ICA. Less than 20% stenosis left ICA.        Electronically signed by: GLENN RICHARDSON MD  Date:     06/25/17  Time:    18:05              Narrative:    Bilateral carotid Doppler ultrasound with duplex and color flow imaging, 06/25/17 17:55:12    History:   Lt sided weakness, fall    The right common carotid artery bifurcation reveals mild plaque. Peak systolic velocity in the right ICA is 132 cm/s. The right ICA/CCA systolic ratio is 1.5. Diastolic ratio is 2.7.    The left common carotid artery bifurcation reveals mild plaque. The peak systolic velocity left ICA is 103 cm/s. The systolic velocity ratio is 1.4 and diastolic ratio 1.6.      There is normal antegrade vertebral artery flow bilaterally.    Validated velocity measurements are extrapolated from diameter data as defined by the Society of Radiologists in Ultrasound Conference Radiology 2003; 229;340-346.                             MRI Brain Without Contrast (Final result)  Result time 06/26/17 08:19:28    Final result by Mikael Mercado MD (06/26/17 08:19:28)                 Impression:        Multiple old lacunar infarcts in the basal ganglia bilaterally and deep white matter of both cerebral hemispheres.  Old lacunar infarct involves the left caudate head.  There an  acute infarct in the posterior basal ganglia on the right side extending to the deep white matter adjacent to the right lateral ventricle.  No evidence of a mass or bleed.      Electronically signed by: SURYA ROGERS MD  Date:     06/26/17  Time:    08:19              Narrative:    MRI of the brain without contrast.    Date: 06/25/17 15:33:20    History:  r/o CVA, new right sided weakness    Standard multiplanar noncontrast sequences of the brain.    Acute right posterior basal ganglia and deep white matter lacunar infarct.  Small bilateral hippocampal cysts.  Multiple small old appearing lacunar infarcts in the basal ganglia and deep white matter bilaterally including the left caudate head.  No evidence of a mass or bleed.  Scattered small vessel disease.                             X-Ray Hip 2 or 3 views Right (Final result)  Result time 06/25/17 15:37:07    Final result by Nicolasa Garcia MD (Timothy) (06/25/17 15:37:07)                 Impression:         No acute bony changes      Electronically signed by: NICOLASA GARCIA MD  Date:     06/25/17  Time:    15:37              Narrative:    Right hip, 3 views    Clinical History:  Right hip pain after fall    Findings:     There is  no acute bony or joint abnormality. No acute fracture or dislocation.                             CT Head Without Contrast (Final result)  Result time 06/25/17 13:29:57    Final result by Nicolasa Garcia MD (Timothy) (06/25/17 13:29:57)                 Impression:         No acute intracranial abnormality    all ct exams at this facility use dose modulation, iterative reconstruction, and /or weight based dosing to reduce radiation dose to low as reasonably achievable.      Electronically signed by: NICOLASA GARCIA MD  Date:     06/25/17  Time:    13:29              Narrative:    Exam: Noncontrast CT head    History:    Weakness    Findings: Atrophy is present.  Old right basal ganglia infarct.  No acute intracranial hemorrhage or focal brain  parenchymal abnormality is identified. Calvarium is intact.     No change compared to 07/23/2015.                              MRI of the brain:             06/26/2017    Assessment and Recommendations:    Patient  With recurrent stroke. Non compliant to stroke prevention parameters, home blood glucose varies from 300-400 on daily basis. He does not take statin, gets constipated.         Differential diagnosis:  1) Acute Right  Subcortical stroke ( Basal Ganglia)   2) [place] dementia  3) Diabetic Neuropathy  4) Diabetic Amyotrophy      Recommendations:  1) Do not treat the bp unless it is > 220/120 for the first 72 hours from the onset of symptoms or first symptoms noticed  2) Stroke work up  3) Diabetes management  4) Check Vitamin B12  Level  5) PT/OT and fall precautions  6) Diabetic diet  7) DVT/GI prophylaxis  8) ECHO  9) Stroke education provided to the family members and the patient.  Reinforced about cardiac and diabetic diet.             Thank you for the consult      Ty Rutherford MD., Ph.D., MS

## 2017-06-26 NOTE — PLAN OF CARE
Problem: Patient Care Overview  Goal: Plan of Care Review  Outcome: Ongoing (interventions implemented as appropriate)  Free of falls/injury during shift  Pain managed effectively w/ PRN meds  DM monitored and managed  SB/NSR on telemetry  MRI positive for stroke  Neuro check remain unchanged w/ slight L side weakness noted  Safety interventions in place  Pt changed to inpatient status

## 2017-06-26 NOTE — ASSESSMENT & PLAN NOTE
- Duo Nebs Q 4 hours PRN, SOB or wheezing   - Mucinex 600 mg BID   - Start Brovana, he is on Spiriva at home   - Monitor

## 2017-06-27 VITALS
HEIGHT: 71 IN | WEIGHT: 191 LBS | HEART RATE: 69 BPM | DIASTOLIC BLOOD PRESSURE: 74 MMHG | OXYGEN SATURATION: 97 % | SYSTOLIC BLOOD PRESSURE: 128 MMHG | TEMPERATURE: 98 F | RESPIRATION RATE: 18 BRPM | BODY MASS INDEX: 26.74 KG/M2

## 2017-06-27 LAB
POCT GLUCOSE: 158 MG/DL (ref 70–110)
POCT GLUCOSE: 212 MG/DL (ref 70–110)
POCT GLUCOSE: 230 MG/DL (ref 70–110)

## 2017-06-27 PROCEDURE — 25000003 PHARM REV CODE 250: Performed by: NURSE PRACTITIONER

## 2017-06-27 PROCEDURE — 97116 GAIT TRAINING THERAPY: CPT

## 2017-06-27 PROCEDURE — 94640 AIRWAY INHALATION TREATMENT: CPT

## 2017-06-27 PROCEDURE — 63600175 PHARM REV CODE 636 W HCPCS: Performed by: NURSE PRACTITIONER

## 2017-06-27 PROCEDURE — 97110 THERAPEUTIC EXERCISES: CPT

## 2017-06-27 PROCEDURE — 25000003 PHARM REV CODE 250: Performed by: EMERGENCY MEDICINE

## 2017-06-27 RX ADMIN — HEPARIN SODIUM 5000 UNITS: 5000 INJECTION, SOLUTION INTRAVENOUS; SUBCUTANEOUS at 06:06

## 2017-06-27 RX ADMIN — GABAPENTIN 300 MG: 300 CAPSULE ORAL at 06:06

## 2017-06-27 RX ADMIN — ARFORMOTEROL TARTRATE 15 MCG: 15 SOLUTION RESPIRATORY (INHALATION) at 07:06

## 2017-06-27 RX ADMIN — INSULIN ASPART 2 UNITS: 100 INJECTION, SOLUTION INTRAVENOUS; SUBCUTANEOUS at 11:06

## 2017-06-27 RX ADMIN — ASPIRIN 81 MG: 81 TABLET, COATED ORAL at 08:06

## 2017-06-27 RX ADMIN — PANTOPRAZOLE SODIUM 600 MG: 40 TABLET, DELAYED RELEASE ORAL at 08:06

## 2017-06-27 NOTE — PT/OT/SLP PROGRESS
Physical Therapy  Treatment    Mikael Cancino   MRN: [id number]   Admitting Diagnosis: Acute CVA (cerebrovascular accident)    PT Received On: 06/27/17  PT Start Time: 1025     PT Stop Time: 1050    PT Total Time (min): 25 min       Billable Minutes:  Gait [id number] and Therapeutic Exercise 10    Treatment Type: Treatment  PT/PTA: PT       General Precautions: Standard, fall  Orthopedic Precautions: N/A   Braces: N/A    Subjective:  Communicated with NURSE CARLSON prior to session.  PT AGREEABLE TO P.T. TX. THIS AM, EAGER TO WALK  Pain/Comfort  Pain Rating 1: 0/10    Objective:   Patient found with: telemetry    Functional Mobility:  Bed Mobility:   Rolling/Turning to Left: Supervision  Rolling/Turning Right: Supervision  Scooting/Bridging: Supervision  Supine to Sit: Supervision    Transfers:  Sit <> Stand Assistance: Stand By Assistance  Sit <> Stand Assistive Device: Rolling Walker  Bed <> Chair Technique: Stand Pivot  Bed <> Chair Assistance: Stand By Assistance  Bed <> Chair Assistive Device: Rolling Walker  Toilet Transfer Technique: Stand Pivot  Toilet Transfer Assistance: Stand By Assistance  Toilet Transfer Assistive Device: No Assistive Device    Gait:   Gait Distance: PT AMB 65' X 2 TRIALS WITH RW AND CGA, SLOW PACED GAIT, MILDLY UNSTEADY DUE TO LLE WEAKNESS, MILD BUCKLING TO L KNEE BUT NO GROSS LOB  Assistance 1: Contact Guard Assistance  Gait Assistive Device: Rolling walker  Gait Pattern: swing-through gait  Gait Deviation(s): decreased joyce, decreased step length, decreased toe-to-floor clearance    Balance:   Static Sit: GOOD  Dynamic Sit: GOOD  Static Stand: GOOD  Dynamic stand:  FAIR    Therapeutic Activities and Exercises:  PT DONNED ROBE WITH SBA, PT DONNED SOCKS WITH SBA.  PT EDUCATED IN AND PERFORMED BLE THEREX X 20 REPS AROM    AM-PAC 6 CLICK MOBILITY  How much help from another person does this patient currently need?   1 = Unable, Total/Dependent Assistance  2 = A lot, Maximum/Moderate  Assistance  3 = A little, Minimum/Contact Guard/Supervision  4 = None, Modified [place]/Independent    Turning over in bed (including adjusting bedclothes, sheets and blankets)?: 4  Sitting down on and standing up from a chair with arms (e.g., wheelchair, bedside commode, etc.): 4  Moving from lying on back to sitting on the side of the bed?: 4  Moving to and from a bed to a chair (including a wheelchair)?: 4  Need to walk in hospital room?: 3  Climbing 3-5 steps with a railing?: 1  Total Score: 20    AM-PAC Raw Score CMS G-Code Modifier Level of Impairment Assistance   6 % Total / Unable   7 - 9 CM 80 - 100% Maximal Assist   10 - 14 CL 60 - 80% Moderate Assist   15 - 19 CK 40 - 60% Moderate Assist   20 - 22 CJ 20 - 40% Minimal Assist   23 CI 1-20% SBA / CGA   24 CH 0% Independent/ Mod I     Patient left up in chair with all lines intact, call button in reach and NURSE notified.    Assessment:  Mikael Cancino is a 76 y.o. male with a medical diagnosis of Acute CVA (cerebrovascular accident)   PT WILL BENEFIT FROM CONT. SKILLED P.T. TO ADDRESS IMPAIRMENTS    Rehab identified problem list/impairments: Rehab identified problem list/impairments: weakness, impaired functional mobilty, gait instability, decreased coordination, decreased safety awareness    Rehab potential is good.    Activity tolerance: Good    Discharge recommendations: Discharge Facility/Level Of Care Needs: outpatient PT     Barriers to discharge: Barriers to Discharge: Inaccessible home environment    Equipment recommendations: Equipment Needed After Discharge: none     GOALS:    Physical Therapy Goals     Not on file          Multidisciplinary Problems (Resolved)        Problem: Physical Therapy Goal    Goal Priority Disciplines Outcome Goal Variances Interventions   Physical Therapy Goal   (Resolved)     PT/OT, PT Outcome(s) achieved     Description:  LTG'S TO BE MET IN 7 DAYS (7-3-17)  1. PT WILL BE ALANA WITH BED MOBILITY  2. PT WILL  BE ALANA WITH TF'S  3. PT WILL ' WITH RW AND SBA  4. PT TOLERATE BLE THEREX X 20 REPS AROM  5. PT WILL DEMO G- DYNAMIC BALANCE DURING GAIT  6. PT WILL ASC/DESC. 4 STEPS USING B RAIL AND SBA                  PLAN:    Patient to be seen 5 x/week  to address the above listed problems via gait training, therapeutic activities, therapeutic exercises, neuromuscular re-education  Plan of Care expires: 07/03/17  Plan of Care reviewed with: patient    PT ENCOURAGED TO CALL FOR ASSISTANCE WITH ALL NEEDS DUE TO FALL RISK STATUS, PT AGREEABLE.  PT ENCOURAGED TO INCREASE TIME OOB IN CHAIR, ALL MEALS IN CHAIR OOB, PT AGREEABLE    Clarisa Jackson, PT  06/27/2017

## 2017-06-27 NOTE — PLAN OF CARE
06/27/17 1049   Medicare Message   Important Message from Medicare regarding Discharge Appeal Rights Given to patient/caregiver;Explained to patient/caregiver;Signed/date by patient/caregiver   Date IMM was signed 06/27/17   Time IMM was signed 1048

## 2017-06-27 NOTE — DISCHARGE SUMMARY
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 He presented to [facility] Emergency Room with complaints left side weakness. Patient fell while getting up out of bed this morning about 9:30 am. Pt states his LLE felt "weak and gave out". He fell on his right hip.   Denies associated symptoms of chest pain, increase shortness of breath, dizziness, palpitations, nausea, vomiting or diuresis.  CT of head showed atrophy present.  Old right basal ganglia infarct.  No acute intracranial hemorrhage or focal brain parenchymal abnormality is identified, calvarium is intact. Rt hip X ray showed no acute bony or joint abnormality. EKG today: Normal sinus rhythm.    * No surgery found *      Indwelling Lines/Drains at time of discharge:   Lines/Drains/Airways          No matching active lines, drains, or airways        Hospital Course:   On 6/25/17 patient was admitted to [facility] for new onset Left sided weakness with a h/o of a prior CVA in 2015 with Right side residual.  MRI here showed an acute infarct in the posterior basal ganglia on the right side extending to the deep white matter adjacent to the right lateral ventricle.  No evidence of a mass or bleed.  Evaluated by Dr. Ortega here, who recommends to continue ASA 81 mg po daily.  He does not need full strength ASA or Plavix per their recommendation.  Neuro also recommends holding home BP meds for 72 hours. Carotid US showed less than 50% stenosis Right ICA, Less than 20% stenosis Left " ICA. ECHO shows EF of 60% with normal LV systolic function. Patient was counseled extensively to monitor BP at home, and to resume home Norvasc and Losartan on Thursday, 6/29.  He reports that he will check his BP at home and record trends for his PCP.  His A1c here was 9.1.  Patient reports his average BG at home ranges from 300-400 and that he does not comply with an ADA diet.  Instructed to check BG TID and keep a log to be evaluated by his PCP.  He will continue his home regimen of Metformin and Insulin upon DC.  Patient has reported an allergy to Statin drugs and he continues to refuse Statin therapy secondary to constipation. He was counseled on the need to comply with his Statin therapy, and verbalized + understanding but is still refusing.  He has remained stable throughout hospitalization and has worked with PT/OT who are recommending that he continue with outpatient Rehab.  Patient seen and examined and deemed medically stable to DC home today.      Consults:   Consults         Status Ordering Provider     Inpatient consult to Diabetes educator  Once     Provider:  (Not yet assigned)    Completed LUCY PRYOR     Inpatient consult to Hospitalist  Once     Provider:  Lucy Pryor NP    Acknowledged RADHA STAPLES     Inpatient consult to Neurology  Once     Provider:  Ty Ortega MD PhD    Completed MJ VILLEGAS     Inpatient consult to Social Work  Once     Provider:  (Not yet assigned)    Completed LUCY PRYOR     Inpatient consult to Social Work  Once     Provider:  (Not yet assigned)    Ordered AIDAN ACOSTA          Significant Diagnostic Studies: Labs:   BMP:     Recent Labs  Lab 06/25/17  1245   *      K 4.3      CO2 26   BUN 20   CREATININE 1.3   CALCIUM 9.1   MG 2.0   , CBC     Recent Labs  Lab 06/25/17  1245   WBC 5.28   HGB 13.2*   HCT 37.8*        A1C:     Recent Labs  Lab 06/25/17 2000   HGBA1C 9.1*     Imaging Results          US  Carotid Bilateral (Final result)  Result time 06/25/17 18:05:24    Final result by Glenn Richardson MD (06/25/17 18:05:24)                 Impression:    Less than 50% stenosis right ICA. Less than 20% stenosis left ICA.        Electronically signed by: GLENN RICHARDSON MD  Date:     06/25/17  Time:    18:05              Narrative:    Bilateral carotid Doppler ultrasound with duplex and color flow imaging, 06/25/17 17:55:12    History:   Lt sided weakness, fall    The right common carotid artery bifurcation reveals mild plaque. Peak systolic velocity in the right ICA is 132 cm/s. The right ICA/CCA systolic ratio is 1.5. Diastolic ratio is 2.7.    The left common carotid artery bifurcation reveals mild plaque. The peak systolic velocity left ICA is 103 cm/s. The systolic velocity ratio is 1.4 and diastolic ratio 1.6.      There is normal antegrade vertebral artery flow bilaterally.    Validated velocity measurements are extrapolated from diameter data as defined by the Society of Radiologists in Ultrasound Conference Radiology 2003; 229;340-346.                             MRI Brain Without Contrast (Final result)  Result time 06/26/17 08:19:28    Final result by Mikael Mercado MD (06/26/17 08:19:28)                 Impression:        Multiple old lacunar infarcts in the basal ganglia bilaterally and deep white matter of both cerebral hemispheres.  Old lacunar infarct involves the left caudate head.  There an acute infarct in the posterior basal ganglia on the right side extending to the deep white matter adjacent to the right lateral ventricle.  No evidence of a mass or bleed.      Electronically signed by: MIKAEL MERCADO MD  Date:     06/26/17  Time:    08:19              Narrative:    MRI of the brain without contrast.    Date: 06/25/17 15:33:20    History:  r/o CVA, new right sided weakness    Standard multiplanar noncontrast sequences of the brain.    Acute right posterior basal ganglia and deep white matter  lacunar infarct.  Small bilateral hippocampal cysts.  Multiple small old appearing lacunar infarcts in the basal ganglia and deep white matter bilaterally including the left caudate head.  No evidence of a mass or bleed.  Scattered small vessel disease.                             X-Ray Hip 2 or 3 views Right (Final result)  Result time 06/25/17 15:37:07    Final result by Nicolasa Garcia MD (Timothy) (06/25/17 15:37:07)                 Impression:         No acute bony changes      Electronically signed by: NICOLASA GARCIA MD  Date:     06/25/17  Time:    15:37              Narrative:    Right hip, 3 views    Clinical History:  Right hip pain after fall    Findings:     There is  no acute bony or joint abnormality. No acute fracture or dislocation.                             CT Head Without Contrast (Final result)  Result time 06/25/17 13:29:57    Final result by Nicolasa Garcia MD (Timothy) (06/25/17 13:29:57)                 Impression:         No acute intracranial abnormality    all ct exams at this facility use dose modulation, iterative reconstruction, and /or weight based dosing to reduce radiation dose to low as reasonably achievable.      Electronically signed by: NICOLASA GARCIA MD  Date:     06/25/17  Time:    13:29              Narrative:    Exam: Noncontrast CT head    History:    Weakness    Findings: Atrophy is present.  Old right basal ganglia infarct.  No acute intracranial hemorrhage or focal brain parenchymal abnormality is identified. Calvarium is intact.     No change compared to 07/23/2015.                            Pending Diagnostic Studies:     Procedure Component Value Units Date/Time    Vitamin B12 Deficiency Panel [[id number]] Collected:  06/26/17 1228    Order Status:  Sent Lab Status:  In process Updated:  06/26/17 2154    Specimen:  Blood         Final Active Diagnoses:    Diagnosis Date Noted POA    PRINCIPAL PROBLEM:  Acute CVA (cerebrovascular accident) [I63.9] 06/25/2017 Yes     Essential (primary) hypertension [I10] 07/02/2014 Yes    Diabetes mellitus [E11.9] 07/16/2015 Yes    Chronic obstructive pulmonary disease [J44.9] 07/02/2014 Yes      Problems Resolved During this Admission:    Diagnosis Date Noted Date Resolved POA      No new Assessment & Plan notes have been filed under this hospital service since the last note was generated.  Service: Hospital Medicine      Discharged Condition: stable    Disposition: Home    Follow Up:  Follow-up Information     BLANCA GIRON MD In 3 days.    Specialty:  Family Medicine  Contact information:  [id number] [address] [address]  [phone]                 Patient Instructions:     Diet Diabetic 1800 Calories     Diet Cardiac     Activity as tolerated     Call MD for:  temperature >100.4     Call MD for:  persistent nausea and vomiting or diarrhea     Call MD for:  severe uncontrolled pain     Call MD for:  redness, tenderness, or signs of infection (pain, swelling, redness, odor or green/yellow discharge around incision site)     Call MD for:  difficulty breathing or increased cough     Call MD for:  severe persistent headache     Call MD for:  worsening rash     Call MD for:  persistent dizziness, light-headedness, or visual disturbances     Call MD for:  increased confusion or weakness       Medications:  Reconciled Home Medications:   Current Discharge Medication List      CONTINUE these medications which have NOT CHANGED    Details   albuterol (PROVENTIL) 2.5 mg /3 mL (0.083 %) nebulizer solution Take 3 mLs (2.5 mg total) by nebulization every 4 (four) hours as needed for Shortness of Breath.  Qty: 360 mL, Refills: 12    Associated Diagnoses: COPD (chronic obstructive pulmonary disease)      amlodipine (NORVASC) 10 MG tablet Take 10 mg by mouth once daily.       aspirin (ECOTRIN) 81 MG EC tablet Take 1 tablet (81 mg total) by mouth once daily.  Qty: 30 tablet, Refills: 0      budesonide-formoterol 160-4.5 mcg  (SYMBICORT) 160-4.5 mcg/actuation HFAA Inhale 2 puffs into the lungs every 12 (twelve) hours. Wash out mouth after using  Qty: 1 Inhaler, Refills: 12    Associated Diagnoses: COPD (chronic obstructive pulmonary disease)      gabapentin (NEURONTIN) 300 MG capsule Take 600 mg by mouth 3 (three) times daily. 2 tablets by mouth 3 times daily      insulin NPH (NOVOLIN N) 100 unit/mL injection 45 units daily  Qty: 10 mL, Refills: 0      losartan (COZAAR) 100 MG tablet Take 100 mg by mouth every evening.       methocarbamol (ROBAXIN) 750 MG Tab Take 750 mg by mouth nightly.      mirtazapine (REMERON) 15 MG tablet Take 15 mg by mouth every evening.      tiotropium (SPIRIVA) 18 mcg inhalation capsule Inhale 1 capsule (18 mcg total) into the lungs Daily.  Qty: 30 capsule, Refills: 12    Comments: Spiriva with Handihaler.  Associated Diagnoses: COPD (chronic obstructive pulmonary disease)      acetaminophen (TYLENOL) 500 mg Cap       clonazepam (KLONOPIN) 1 MG tablet Take 1 mg by mouth every evening.      fluticasone (FLONASE) 50 mcg/actuation nasal spray 2 sprays by Nasal route.      insulin aspart (NOVOLOG) 100 unit/mL injection 5 units  Sq  Before dinner  Qty: 10 mL, Refills: 0      metformin (GLUCOPHAGE) 500 MG tablet Take 500 mg by mouth once daily.         STOP taking these medications       atorvastatin (LIPITOR) 40 MG tablet Comments:   Reason for Stopping: Patient has reported allergy to Statin Drugs         benzonatate (TESSALON) 100 MG capsule Comments:   Reason for Stopping:             Time spent on the discharge of patient: 45 minutes    Dianna Costa NP  Department of Hospital Medicine  [facility]    I have seen and examined the patient at bedside. I reviewed the notes, assessments, and/or procedures performed by Dianna costa, I concur with her documentation of Mikael Cancino.    Pt is stable for discharge.   Follow up with pcp and neurology in 1 week.     Dilan Porras MD

## 2017-06-27 NOTE — PLAN OF CARE
Problem: Patient Care Overview  Goal: Plan of Care Review  Outcome: Ongoing (interventions implemented as appropriate)  POC discussed w/patient and son, verbalized understanding. SB on monitor. VSS. Voids per urinal. No c/o of pain. Neuro checks performed.  Patient turns independently in bed. Fall precautions in place, bed alarm on.

## 2017-06-27 NOTE — PLAN OF CARE
06/27/17 1420   Final Note   Assessment Type Final Discharge Note   Discharge Disposition Home   What phone number can be called within the next 1-3 days to see how you are doing after discharge? ([phone])   Patient signed patient preference for [facility], [phone]. Phoned, requested orders be faxed to [phone]. They will call patient with the appointment time.   Faxed orders to [facility], noting patient preference.

## 2017-06-27 NOTE — NURSING
Patient discharged per MD order. Discharge instructions and handout given to patient. Patient verbalizes understanding and has no questions at this time. IV discontinued, telemetry removed and returned to monitor tech. Patient transported via wheelchair to vehicle with all personal belongings.

## 2017-06-28 ENCOUNTER — PATIENT OUTREACH (OUTPATIENT)
Dept: ADMINISTRATIVE | Facility: CLINIC | Age: 77
End: 2017-06-28

## 2017-06-28 LAB — VIT B12 SERPL-MCNC: 347 NG/L

## 2017-06-28 NOTE — PATIENT INSTRUCTIONS
Discharge Instructions for Stroke  You have been diagnosed with a stroke, or with a TIA (transient ischemic attack). Or you have been identified as having a high risk for stroke. During a stroke, blood stops flowing to part of your brain. This can damage areas in the brain that control other parts of the body. Symptoms after a stroke depend on which part of the brain has been affected.  Stroke risk factors  Once youve had a stroke, youre at greater risk for another one. Listed below are some other factors that can increase your risk for a stroke:  · High blood pressure  · High cholesterol  · Cigarette or cigar smoking  · Diabetes  · Carotid or other artery disease  · Atrial fibrillation, atrial flutter, or other heart disease  · Not being physically active  · Obesity  · Certain blood disorders (such as sickle cell anemia)  · Excessive alcohol use  · Abuse of street drugs  · Race  · Gender  · Family history of stroke  · Diet high in salty, fried, or greasy foods  Changes in daily living  Doing your regular tasks may be difficult after youve had a stroke, but you can learn new ways to manage your daily activities. In fact, doing daily activities may help you to regain muscle strength and bring back function to affected limbs. Be patient, give yourself time to adjust, and appreciate the progress you make.  Daily activities  You may be at risk of falling. Make changes to your home to help you walk more easily. A therapist will decide if you need an assistive device to walk safely.  You may need to see an occupational therapist or physical therapist to learn new ways of doing things. For example, you may need to make adjustments when bathing or dressing:  Tips for showering or bathing  · Test the water temperature with a hand or foot that was not affected by the stroke.  · Use grab bars, a shower seat, a hand-held showerhead, and a long-handled brush.  Tips for getting dressed  · Dress while sitting, starting with  the affected side or limb.  · Wear shirts that pull easily over your head. Wear pants or skirts with elastic waistbands.  · Use zippers with loops attached to the pull tabs.  Lifestyle changes  · Take your medicines exactly as directed. Dont skip doses.  · Begin an exercise program. Ask your provider how to get started. Also ask how much activity you should try to get on a daily or weekly basis. You can benefit from simple activities such as walking or gardening.  · Limit alcohol intake. Men should have no more than 2 alcoholic drinks a day. Women should limit themselves to 1 alcoholic drink per day.  · Know your cholesterol level. Follow your providers recommendations about how to keep cholesterol under control.  · If you are a smoker, quit now. Join a stop-smoking program to improve your chances of success. Ask your provider about medicines or other methods to help you quit.  · Learn stress management techniques to help you deal with stress in your home and work life.  Diet  Your healthcare provider will give you information on dietary changes that you may need to make, based on your situation. Your provider may recommend that you see a registered dietitian for help with diet changes. Changes may include:  · Reducing fat and cholesterol intake  · Reducing salt (sodium) intake, especially if you have high blood pressure  · Eating more fresh vegetables and fruits  · Eating more lean proteins, such as fish, poultry, and beans and peas (legumes)  · Eating less red meat and processed meats  · Using low-fat dairy products  · Limiting vegetable oils and nut oils  · Limiting sweets and processed foods such as chips, cookies, and baked goods  Follow-up care  · Keep your medical appointments. Close follow-up is important to stroke rehabilitation and recovery.  · Some medicines require blood tests to check for progress or problems. Keep follow-up appointments for any blood tests ordered by your providers.  When to call  911  Call 911 right away if you have any of the following symptoms of stroke:  · Weakness, tingling, or loss of feeling on one side of your face or body  · Sudden double vision or trouble seeing in one or both eyes  · Sudden trouble talking or slurred speech  · Trouble understanding others  · Sudden, severe headache  · Dizziness, loss of balance, or a sense of falling  · Blackouts or seizures      F.A.S.T. is an easy way to remember the signs of stroke. When you see these signs, you know that you need to call 911 fast.  F.A.S.T. stands for:  · F is for face drooping. One side of the face is drooping or numb. When the person smiles, the smile is uneven.  · A is for arm weakness. One arm is weak or numb. When the person lifts both arms at the same time, one arm may drift downward.  · S is for speech difficulty. You may notice slurred speech or trouble speaking. The person can't repeat a simple sentence correctly when asked.  · T is for time to call 911. If someone shows any of these symptoms, even if they go away, call 911 immediately. Make note of the time the symptoms first appeared.  Date Last Reviewed: 8/26/2015 © 2000-2016 [employer]. [address] [place] [address], [place] [address]. All rights reserved. This information is not intended as a substitute for professional medical care. Always follow your healthcare professional's instructions.

## 2017-06-29 LAB — METHYLMALONATE SERPL-SCNC: 0.2 NMOL/ML

## 2017-07-06 ENCOUNTER — OFFICE VISIT (OUTPATIENT)
Dept: FAMILY MEDICINE | Facility: CLINIC | Age: 77
End: 2017-07-06
Payer: MEDICARE

## 2017-07-06 ENCOUNTER — LAB VISIT (OUTPATIENT)
Dept: LAB | Facility: HOSPITAL | Age: 77
End: 2017-07-06
Attending: FAMILY MEDICINE
Payer: MEDICARE

## 2017-07-06 VITALS
DIASTOLIC BLOOD PRESSURE: 70 MMHG | HEIGHT: 71 IN | SYSTOLIC BLOOD PRESSURE: 144 MMHG | BODY MASS INDEX: 27.78 KG/M2 | OXYGEN SATURATION: 98 % | TEMPERATURE: 96 F | WEIGHT: 198.44 LBS | HEART RATE: 74 BPM

## 2017-07-06 DIAGNOSIS — E78.5 DM TYPE 2 WITH DIABETIC DYSLIPIDEMIA: ICD-10-CM

## 2017-07-06 DIAGNOSIS — E11.69 DM TYPE 2 WITH DIABETIC DYSLIPIDEMIA: ICD-10-CM

## 2017-07-06 DIAGNOSIS — J61 PULMONARY ASBESTOSIS: ICD-10-CM

## 2017-07-06 DIAGNOSIS — I63.9 CEREBROVASCULAR ACCIDENT (CVA), UNSPECIFIED MECHANISM: Primary | ICD-10-CM

## 2017-07-06 DIAGNOSIS — E11.9 DIABETES MELLITUS WITHOUT COMPLICATION: ICD-10-CM

## 2017-07-06 DIAGNOSIS — E11.9 TYPE 2 DIABETES MELLITUS WITHOUT COMPLICATION: ICD-10-CM

## 2017-07-06 DIAGNOSIS — J44.89 BRONCHITIS WITH AIRWAY OBSTRUCTION: ICD-10-CM

## 2017-07-06 LAB
ALBUMIN SERPL BCP-MCNC: 4.1 G/DL
ALP SERPL-CCNC: 106 U/L
ALT SERPL W/O P-5'-P-CCNC: 14 U/L
ANION GAP SERPL CALC-SCNC: 11 MMOL/L
AST SERPL-CCNC: 16 U/L
BASOPHILS # BLD AUTO: 0.08 K/UL
BASOPHILS NFR BLD: 1.1 %
BILIRUB SERPL-MCNC: 0.7 MG/DL
BUN SERPL-MCNC: 20 MG/DL
CALCIUM SERPL-MCNC: 9.6 MG/DL
CHLORIDE SERPL-SCNC: 105 MMOL/L
CHOLEST/HDLC SERPL: 7.2 {RATIO}
CO2 SERPL-SCNC: 25 MMOL/L
CREAT SERPL-MCNC: 1.3 MG/DL
CREAT UR-MCNC: 251 MG/DL
CREAT UR-MCNC: 251 MG/DL
DIFFERENTIAL METHOD: NORMAL
EOSINOPHIL # BLD AUTO: 0.4 K/UL
EOSINOPHIL NFR BLD: 5.4 %
ERYTHROCYTE [DISTWIDTH] IN BLOOD BY AUTOMATED COUNT: 13.4 %
EST. GFR  (AFRICAN AMERICAN): >60 ML/MIN/1.73 M^2
EST. GFR  (NON AFRICAN AMERICAN): 52.3 ML/MIN/1.73 M^2
GLUCOSE SERPL-MCNC: 116 MG/DL
HCT VFR BLD AUTO: 43 %
HDL/CHOLESTEROL RATIO: 13.9 %
HDLC SERPL-MCNC: 230 MG/DL
HDLC SERPL-MCNC: 32 MG/DL
HGB BLD-MCNC: 14.4 G/DL
LDLC SERPL CALC-MCNC: 155.4 MG/DL
LYMPHOCYTES # BLD AUTO: 1.8 K/UL
LYMPHOCYTES NFR BLD: 24 %
MCH RBC QN AUTO: 29.8 PG
MCHC RBC AUTO-ENTMCNC: 33.5 %
MCV RBC AUTO: 89 FL
MICROALBUMIN UR DL<=1MG/L-MCNC: 65 UG/ML
MICROALBUMIN UR DL<=1MG/L-MCNC: 65 UG/ML
MICROALBUMIN/CREATININE RATIO: 25.9 UG/MG
MICROALBUMIN/CREATININE RATIO: 25.9 UG/MG
MONOCYTES # BLD AUTO: 0.8 K/UL
MONOCYTES NFR BLD: 9.9 %
NEUTROPHILS # BLD AUTO: 4.5 K/UL
NEUTROPHILS NFR BLD: 59.1 %
NONHDLC SERPL-MCNC: 198 MG/DL
PLATELET # BLD AUTO: 284 K/UL
PMV BLD AUTO: 10.5 FL
POTASSIUM SERPL-SCNC: 4.4 MMOL/L
PROT SERPL-MCNC: 8.1 G/DL
RBC # BLD AUTO: 4.83 M/UL
SODIUM SERPL-SCNC: 141 MMOL/L
TRIGL SERPL-MCNC: 213 MG/DL
WBC # BLD AUTO: 7.58 K/UL

## 2017-07-06 PROCEDURE — 85025 COMPLETE CBC W/AUTO DIFF WBC: CPT

## 2017-07-06 PROCEDURE — 80053 COMPREHEN METABOLIC PANEL: CPT

## 2017-07-06 PROCEDURE — 36415 COLL VENOUS BLD VENIPUNCTURE: CPT | Mod: PO

## 2017-07-06 PROCEDURE — 80061 LIPID PANEL: CPT

## 2017-07-06 PROCEDURE — 99999 PR PBB SHADOW E&M-EST. PATIENT-LVL III: CPT | Mod: PBBFAC,,, | Performed by: FAMILY MEDICINE

## 2017-07-06 PROCEDURE — 99499 UNLISTED E&M SERVICE: CPT | Mod: S$GLB,,, | Performed by: FAMILY MEDICINE

## 2017-07-06 PROCEDURE — 83036 HEMOGLOBIN GLYCOSYLATED A1C: CPT

## 2017-07-06 RX ORDER — PRAVASTATIN SODIUM 10 MG/1
10 TABLET ORAL DAILY
Qty: 90 TABLET | Refills: 3 | Status: SHIPPED | OUTPATIENT
Start: 2017-07-06 | End: 2017-11-21

## 2017-07-06 NOTE — PROGRESS NOTES
Subjective:       Patient ID: Mikael Cancino is a 76 y.o. male.    Chief Complaint: Hospital Follow Up    Transitional Care Note    Family and/or Caretaker present at visit?  no  Diagnostic tests reviewed/disposition: yes  Disease/illness education: yes  Home health/community services discussion/referrals: yes  Establishment or re-establishment of referral orders for community resources: yes  Discussion with other health care providers:No     76 y old male with COPD, Type 2 dm here for f.u after hosp . He was found to have a  acute infarct in the posterior basal ganglia on the right side .discharged on 6/27 . Strength on left LE is much better . Declines H/H . Monitors BS fastin  112,  4 hrs after dinner; 199 . Not getting PT either .reg COPD, he see Dr Galicia, [facility], due for f/u in 5 m  . He is not taking statin due to constipation , he tried  3 ??       Review of Systems   Respiratory: Negative.    Cardiovascular: Negative.    Gastrointestinal: Negative.    Neurological: Positive for weakness.   Hematological: Negative.        Objective:      Physical Exam   Constitutional: He is oriented to person, place, and time. He appears well-developed and well-nourished. No distress.   HENT:   Head: Normocephalic and atraumatic.   Right Ear: External ear normal.   Left Ear: External ear normal.   Nose: Nose normal.   Mouth/Throat: No oropharyngeal exudate.   Eyes: Conjunctivae and EOM are normal. Pupils are equal, round, and reactive to light. Right eye exhibits no discharge. Left eye exhibits no discharge. No scleral icterus.   Neck: Normal range of motion. Neck supple. No JVD present. No tracheal deviation present. No thyromegaly present.   Cardiovascular: Normal rate, regular rhythm, normal heart sounds and intact distal pulses.  Exam reveals no gallop and no friction rub.    No murmur heard.  Pulmonary/Chest: Effort normal and breath sounds normal. No stridor. No respiratory  distress. He has no wheezes. He has no rales. He exhibits no tenderness.   Abdominal: Soft. Bowel sounds are normal. He exhibits no distension. There is no tenderness. There is no rebound and no guarding.   Musculoskeletal: Normal range of motion. He exhibits no edema or tenderness.   Lymphadenopathy:     He has no cervical adenopathy.   Neurological: He is alert and oriented to person, place, and time. He has normal strength and normal reflexes. He displays normal reflexes. No sensory deficit. He exhibits normal muscle tone. He displays a negative Romberg sign. Coordination normal.   Skin: Skin is warm and dry. No rash noted. He is not diaphoretic. No erythema. No pallor.   Psychiatric: He has a normal mood and affect. His behavior is normal. Judgment and thought content normal.       Assessment:     Mikael was seen today for hospital follow up.    Diagnoses and all orders for this visit:    Cerebrovascular accident (CVA), unspecified mechanism  -     Ambulatory Referral to Physical/Occupational Therapy    DM type 2 with diabetic dyslipidemia  -     CBC auto differential; Future  -     Comprehensive metabolic panel; Future  -     Lipid panel; Future  -     Microalbumin/creatinine urine ratio    Diabetes mellitus without complication    Bronchitis with airway obstruction    Pulmonary asbestosis    Other orders  -     pravastatin (PRAVACHOL) 10 MG tablet; Take 1 tablet (10 mg total) by mouth once daily.      Plan:   Get P/OT . Reassess in 1 m   Resume statin(pravastain) will call if constipation recurs   Stable . F.u with PULM

## 2017-07-07 LAB
ESTIMATED AVG GLUCOSE: 212 MG/DL
HBA1C MFR BLD HPLC: 9 %

## 2017-07-26 PROCEDURE — 99495 TRANSJ CARE MGMT MOD F2F 14D: CPT | Mod: S$GLB,,, | Performed by: FAMILY MEDICINE

## 2017-08-10 ENCOUNTER — OFFICE VISIT (OUTPATIENT)
Dept: FAMILY MEDICINE | Facility: CLINIC | Age: 77
End: 2017-08-10
Payer: MEDICARE

## 2017-08-10 VITALS
SYSTOLIC BLOOD PRESSURE: 130 MMHG | OXYGEN SATURATION: 98 % | WEIGHT: 200.38 LBS | BODY MASS INDEX: 27.95 KG/M2 | DIASTOLIC BLOOD PRESSURE: 62 MMHG | HEART RATE: 76 BPM | TEMPERATURE: 97 F

## 2017-08-10 DIAGNOSIS — I10 ESSENTIAL HYPERTENSION: ICD-10-CM

## 2017-08-10 DIAGNOSIS — E78.5 DYSLIPIDEMIA: ICD-10-CM

## 2017-08-10 PROCEDURE — 3075F SYST BP GE 130 - 139MM HG: CPT | Mod: S$GLB,,, | Performed by: FAMILY MEDICINE

## 2017-08-10 PROCEDURE — 3078F DIAST BP <80 MM HG: CPT | Mod: S$GLB,,, | Performed by: FAMILY MEDICINE

## 2017-08-10 PROCEDURE — 99999 PR PBB SHADOW E&M-EST. PATIENT-LVL II: CPT | Mod: PBBFAC,,, | Performed by: FAMILY MEDICINE

## 2017-08-10 PROCEDURE — 3008F BODY MASS INDEX DOCD: CPT | Mod: S$GLB,,, | Performed by: FAMILY MEDICINE

## 2017-08-10 PROCEDURE — 99214 OFFICE O/P EST MOD 30 MIN: CPT | Mod: S$GLB,,, | Performed by: FAMILY MEDICINE

## 2017-08-10 PROCEDURE — 99499 UNLISTED E&M SERVICE: CPT | Mod: S$GLB,,, | Performed by: FAMILY MEDICINE

## 2017-08-10 PROCEDURE — 1159F MED LIST DOCD IN RCRD: CPT | Mod: S$GLB,,, | Performed by: FAMILY MEDICINE

## 2017-08-10 NOTE — PROGRESS NOTES
Subjective:       Patient ID: Mikael Cancino is a 76 y.o. male.    Chief Complaint: No chief complaint on file.    76 y old male  here for f/u on DM , HTN and DLP . On aspirin, Immunizations have been Updated at [facility] . exercises:  stays active with his land . Tries to adhere to  a low carb diet.  Opthalmology : seen at [facility] , last visit was  DEC16  , Uses NPH  qhs  Anywhere form 35 units or 40 units depending on BS readings .   (190mg/dl :  35  U,  240 mg/dl: 40  Units ) He also uses Aspart 5 units before dinner. He wakes up with low readings : 90 which scare him. compliant with statin       Review of Systems   Constitutional: Negative.    HENT: Negative.    Respiratory: Negative.    Cardiovascular: Negative.    Gastrointestinal: Negative.    Genitourinary: Negative.    Musculoskeletal: Negative.    Hematological: Negative.        Objective:      Physical Exam   Constitutional: He is oriented to person, place, and time. He appears well-developed and well-nourished. No distress.   HENT:   Head: Normocephalic and atraumatic.   Right Ear: External ear normal.   Left Ear: External ear normal.   Nose: Nose normal.   Mouth/Throat: No oropharyngeal exudate.   Eyes: Conjunctivae and EOM are normal. Pupils are equal, round, and reactive to light. Right eye exhibits no discharge. Left eye exhibits no discharge. No scleral icterus.   Neck: Normal range of motion. Neck supple. No JVD present. No tracheal deviation present. No thyromegaly present.   Cardiovascular: Normal rate, regular rhythm, normal heart sounds and intact distal pulses.  Exam reveals no gallop and no friction rub.    No murmur heard.  Pulses:       Dorsalis pedis pulses are 2+ on the right side, and 2+ on the left side.        Posterior tibial pulses are 2+ on the right side, and 2+ on the left side.   Pulmonary/Chest: Effort normal and breath sounds normal. No stridor. No respiratory distress. He has no wheezes. He has no rales. He exhibits no  tenderness.   Abdominal: Soft. Bowel sounds are normal. He exhibits no distension. There is no tenderness. There is no rebound and no guarding.   Musculoskeletal: Normal range of motion. He exhibits no edema or tenderness.        Right foot: There is normal range of motion and no deformity.        Left foot: There is no deformity.   Feet:   Right Foot:   Protective Sensation: 10 sites tested. 0 sites sensed.   Skin Integrity: Negative for ulcer, blister, skin breakdown, erythema, warmth, callus or dry skin.   Left Foot:   Protective Sensation: 10 sites tested. 0 sites sensed.   Skin Integrity: Negative for ulcer, blister, skin breakdown, erythema, warmth, callus or dry skin.   Lymphadenopathy:     He has no cervical adenopathy.   Neurological: He is alert and oriented to person, place, and time. He has normal reflexes. He displays normal reflexes. No cranial nerve deficit. He exhibits normal muscle tone. Coordination normal.   Skin: Skin is warm and dry. No rash noted. He is not diaphoretic. No erythema. No pallor.   thickened yellowish toenail plates bilaterally    Psychiatric: He has a normal mood and affect. His behavior is normal. Judgment and thought content normal.       Assessment:     Diagnoses and all orders for this visit:    Type 2 diabetes, uncontrolled, with neuropathy    Essential hypertension    Dyslipidemia      Plan:   Uncontrolled. Check BS Pre meals and 2 hrs pp  . Rogelio in 2 w  Lab Results   Component Value Date    HGBA1C 9.0 (H) 07/06/2017   Controlled ,cont med   Cont statin . Labs in 3 m

## 2017-08-24 ENCOUNTER — OFFICE VISIT (OUTPATIENT)
Dept: FAMILY MEDICINE | Facility: CLINIC | Age: 77
End: 2017-08-24
Payer: MEDICARE

## 2017-08-24 VITALS
TEMPERATURE: 96 F | HEIGHT: 71 IN | HEART RATE: 66 BPM | OXYGEN SATURATION: 97 % | BODY MASS INDEX: 27.93 KG/M2 | SYSTOLIC BLOOD PRESSURE: 128 MMHG | WEIGHT: 199.5 LBS | DIASTOLIC BLOOD PRESSURE: 66 MMHG

## 2017-08-24 DIAGNOSIS — Z23 NEED FOR VACCINATION AGAINST STREPTOCOCCUS PNEUMONIAE: ICD-10-CM

## 2017-08-24 PROCEDURE — 3008F BODY MASS INDEX DOCD: CPT | Mod: S$GLB,,, | Performed by: NURSE PRACTITIONER

## 2017-08-24 PROCEDURE — 90670 PCV13 VACCINE IM: CPT | Mod: S$GLB,,, | Performed by: NURSE PRACTITIONER

## 2017-08-24 PROCEDURE — 99999 PR PBB SHADOW E&M-EST. PATIENT-LVL V: CPT | Mod: PBBFAC,,, | Performed by: NURSE PRACTITIONER

## 2017-08-24 PROCEDURE — 1126F AMNT PAIN NOTED NONE PRSNT: CPT | Mod: S$GLB,,, | Performed by: NURSE PRACTITIONER

## 2017-08-24 PROCEDURE — G0009 ADMIN PNEUMOCOCCAL VACCINE: HCPCS | Mod: S$GLB,,, | Performed by: NURSE PRACTITIONER

## 2017-08-24 PROCEDURE — 3074F SYST BP LT 130 MM HG: CPT | Mod: S$GLB,,, | Performed by: NURSE PRACTITIONER

## 2017-08-24 PROCEDURE — 99213 OFFICE O/P EST LOW 20 MIN: CPT | Mod: S$GLB,,, | Performed by: NURSE PRACTITIONER

## 2017-08-24 PROCEDURE — 99499 UNLISTED E&M SERVICE: CPT | Mod: S$GLB,,, | Performed by: NURSE PRACTITIONER

## 2017-08-24 PROCEDURE — 1159F MED LIST DOCD IN RCRD: CPT | Mod: S$GLB,,, | Performed by: NURSE PRACTITIONER

## 2017-08-24 PROCEDURE — 3078F DIAST BP <80 MM HG: CPT | Mod: S$GLB,,, | Performed by: NURSE PRACTITIONER

## 2017-08-24 NOTE — PROGRESS NOTES
Subjective:       Patient ID: Mikael Cancino is a 76 y.o. male.    Chief Complaint: Follow-up  Pt reports to clinic for follow up evaluation of DM.  Pt didn't not bring glucose log to appt today.  Reports the highest FBS that he is able to recall is 190.  States he has had FBS in 70's-80's and he feels jittery.notes HS glucose make of 230; does not monitor diet.  Has only been taking Novolin 40 units daily.  Pt was prescribed novolog 5 units before dinner but has been skipping dose.    HPI  Review of Systems   Constitutional: Negative for activity change and chills.   HENT: Negative.    Respiratory: Negative.    Cardiovascular: Negative.    Gastrointestinal: Negative.    Musculoskeletal: Positive for arthralgias and myalgias.       Objective:      Physical Exam   Constitutional: He is oriented to person, place, and time. He appears well-developed and well-nourished.   HENT:   Head: Normocephalic.   Eyes: EOM are normal.   Neck: Neck supple.   Cardiovascular: Normal rate and normal heart sounds.    Pulmonary/Chest: Effort normal and breath sounds normal.   Neurological: He is alert and oriented to person, place, and time.   Vitals reviewed.      Assessment:       1. Uncontrolled type 2 diabetes mellitus with nephropathy    2. Need for vaccination against Streptococcus pneumoniae        Plan:   Uncontrolled type 2 diabetes mellitus with nephropathy  Difficult to make adjustments to patients medication without reviewing readings.  However, based on patients memory he has symptomatic hypoglycemia @74; and some elevated readings at 230.  Pt instructed to continue current treatment plan including novolog at 5 units before dinner.  Have small bedtime snack.  Bring readings to  in 2 weeks for review   Need for vaccination against Streptococcus pneumoniae  -     (In Office Administered) Pneumococcal Conjugate Vaccine (13 Valent) (IM)      Return in about 3 months (around 11/24/2017).

## 2017-11-21 ENCOUNTER — HOSPITAL ENCOUNTER (OUTPATIENT)
Dept: RADIOLOGY | Facility: HOSPITAL | Age: 77
Discharge: HOME | End: 2017-11-21
Attending: NURSE PRACTITIONER
Payer: MEDICARE

## 2017-11-21 ENCOUNTER — OFFICE VISIT (OUTPATIENT)
Dept: FAMILY MEDICINE | Facility: CLINIC | Age: 77
End: 2017-11-21
Payer: MEDICARE

## 2017-11-21 VITALS
SYSTOLIC BLOOD PRESSURE: 134 MMHG | OXYGEN SATURATION: 98 % | TEMPERATURE: 98 F | HEIGHT: 71 IN | HEART RATE: 93 BPM | WEIGHT: 201.19 LBS | DIASTOLIC BLOOD PRESSURE: 70 MMHG | RESPIRATION RATE: 18 BRPM | BODY MASS INDEX: 28.17 KG/M2

## 2017-11-21 DIAGNOSIS — R07.81 RIB PAIN ON LEFT SIDE: ICD-10-CM

## 2017-11-21 DIAGNOSIS — I10 ESSENTIAL (PRIMARY) HYPERTENSION: ICD-10-CM

## 2017-11-21 DIAGNOSIS — E78.2 DM TYPE 2 WITH DIABETIC MIXED HYPERLIPIDEMIA: Primary | ICD-10-CM

## 2017-11-21 DIAGNOSIS — E11.69 DM TYPE 2 WITH DIABETIC MIXED HYPERLIPIDEMIA: Primary | ICD-10-CM

## 2017-11-21 PROCEDURE — 99499 UNLISTED E&M SERVICE: CPT | Mod: S$GLB,,, | Performed by: NURSE PRACTITIONER

## 2017-11-21 PROCEDURE — 99214 OFFICE O/P EST MOD 30 MIN: CPT | Mod: S$GLB,,, | Performed by: NURSE PRACTITIONER

## 2017-11-21 PROCEDURE — 99999 PR PBB SHADOW E&M-EST. PATIENT-LVL V: CPT | Mod: PBBFAC,,, | Performed by: NURSE PRACTITIONER

## 2017-11-21 PROCEDURE — 71101 X-RAY EXAM UNILAT RIBS/CHEST: CPT | Mod: 26,,, | Performed by: RADIOLOGY

## 2017-11-21 PROCEDURE — 71101 X-RAY EXAM UNILAT RIBS/CHEST: CPT | Mod: TC,PO

## 2017-11-22 NOTE — PROGRESS NOTES
Subjective:       Patient ID: Mikael Cancino is a 77 y.o. male.    Chief Complaint: Follow-up  Pt reports to clinic for DM follow up .  Does not monitor diet, physically active with yard work.  Did not bring glucose meter to appt but reports a FBS this morning of 130; pt reports if glucose is less than 110, he begins to experience hypoglycemia symptoms.  Not fasting today, will obtain fasting labs on later date.  Reports he is having cataract extraction at the end of the month at [facility].    HPI  Review of Systems   Constitutional: Negative.    HENT: Negative.    Respiratory: Negative.    Cardiovascular: Negative.    Gastrointestinal: Negative.    Genitourinary: Negative.    Neurological: Negative.    Psychiatric/Behavioral: Negative.        Objective:      Physical Exam   Constitutional: He is oriented to person, place, and time. He appears well-developed and well-nourished.   HENT:   Head: Normocephalic.   Eyes: EOM are normal.   Neck: Neck supple.   Cardiovascular: Normal rate and normal heart sounds.    Pulmonary/Chest: Effort normal and breath sounds normal.       Abdominal: Soft. Bowel sounds are normal. There is no tenderness.   Musculoskeletal: Normal range of motion.   Neurological: He is alert and oriented to person, place, and time.   Skin: Skin is warm and dry.   Psychiatric: He has a normal mood and affect.   Vitals reviewed.      Assessment:       1. DM type 2 with diabetic mixed hyperlipidemia    2. Essential (primary) hypertension    3. Rib pain on left side        Plan:   DM type 2 with diabetic mixed hyperlipidemia  -     Hemoglobin A1c; Future; Expected date: 11/21/2017  -     Lipid panel; Future; Expected date: 11/21/2017  -     PSA, Screening; Future; Expected date: 11/21/2017  -uncontrolled. Patient instructed to bring on follow up appt. Will wait for fasting labs before scheduling  Essential (primary) hypertension  Stable. Continue current treatment plan  Rib pain on left side  -      X-Ray Ribs 3 Views Left; Future; Expected date: 11/21/2017      No Follow-up on file.

## 2017-11-27 ENCOUNTER — LAB VISIT (OUTPATIENT)
Dept: LAB | Facility: HOSPITAL | Age: 77
End: 2017-11-27
Attending: NURSE PRACTITIONER
Payer: MEDICARE

## 2017-11-27 DIAGNOSIS — E11.69 DM TYPE 2 WITH DIABETIC MIXED HYPERLIPIDEMIA: ICD-10-CM

## 2017-11-27 DIAGNOSIS — E78.2 DM TYPE 2 WITH DIABETIC MIXED HYPERLIPIDEMIA: ICD-10-CM

## 2017-11-27 LAB
CHOLEST SERPL-MCNC: 222 MG/DL
CHOLEST/HDLC SERPL: 6.5 {RATIO}
COMPLEXED PSA SERPL-MCNC: 2.3 NG/ML
ESTIMATED AVG GLUCOSE: 189 MG/DL
HBA1C MFR BLD HPLC: 8.2 %
HDLC SERPL-MCNC: 34 MG/DL
HDLC SERPL: 15.3 %
LDLC SERPL CALC-MCNC: 168 MG/DL
NONHDLC SERPL-MCNC: 188 MG/DL
TRIGL SERPL-MCNC: 100 MG/DL

## 2017-11-27 PROCEDURE — 83036 HEMOGLOBIN GLYCOSYLATED A1C: CPT

## 2017-11-27 PROCEDURE — 80061 LIPID PANEL: CPT

## 2017-11-27 PROCEDURE — 36415 COLL VENOUS BLD VENIPUNCTURE: CPT | Mod: PO

## 2017-11-27 PROCEDURE — 84153 ASSAY OF PSA TOTAL: CPT

## 2017-12-05 ENCOUNTER — OFFICE VISIT (OUTPATIENT)
Dept: PULMONOLOGY | Facility: CLINIC | Age: 77
End: 2017-12-05
Payer: MEDICARE

## 2017-12-05 ENCOUNTER — HOSPITAL ENCOUNTER (OUTPATIENT)
Dept: RADIOLOGY | Facility: HOSPITAL | Age: 77
Discharge: HOME | End: 2017-12-05
Attending: INTERNAL MEDICINE
Payer: MEDICARE

## 2017-12-05 ENCOUNTER — PROCEDURE VISIT (OUTPATIENT)
Dept: PULMONOLOGY | Facility: CLINIC | Age: 77
End: 2017-12-05
Payer: MEDICARE

## 2017-12-05 VITALS
BODY MASS INDEX: 29.32 KG/M2 | HEART RATE: 62 BPM | WEIGHT: 209.44 LBS | SYSTOLIC BLOOD PRESSURE: 126 MMHG | DIASTOLIC BLOOD PRESSURE: 74 MMHG | RESPIRATION RATE: 17 BRPM | HEIGHT: 71 IN | OXYGEN SATURATION: 98 %

## 2017-12-05 DIAGNOSIS — J44.9 CHRONIC OBSTRUCTIVE PULMONARY DISEASE, UNSPECIFIED COPD TYPE: ICD-10-CM

## 2017-12-05 DIAGNOSIS — J61 PULMONARY ASBESTOSIS: ICD-10-CM

## 2017-12-05 DIAGNOSIS — J43.8 OTHER EMPHYSEMA: ICD-10-CM

## 2017-12-05 DIAGNOSIS — J61 PULMONARY ASBESTOSIS: Primary | ICD-10-CM

## 2017-12-05 PROCEDURE — 99499 UNLISTED E&M SERVICE: CPT | Mod: S$GLB,,, | Performed by: INTERNAL MEDICINE

## 2017-12-05 PROCEDURE — 94729 DIFFUSING CAPACITY: CPT | Mod: S$GLB,,, | Performed by: INTERNAL MEDICINE

## 2017-12-05 PROCEDURE — 71030 XR CHEST 4 OR MORE VIEW: CPT | Mod: 26,,, | Performed by: RADIOLOGY

## 2017-12-05 PROCEDURE — 99214 OFFICE O/P EST MOD 30 MIN: CPT | Mod: 25,S$GLB,, | Performed by: INTERNAL MEDICINE

## 2017-12-05 PROCEDURE — 99999 PR PBB SHADOW E&M-EST. PATIENT-LVL V: CPT | Mod: PBBFAC,,, | Performed by: INTERNAL MEDICINE

## 2017-12-05 PROCEDURE — 71030 XR CHEST 4 OR MORE VIEW: CPT | Mod: TC

## 2017-12-05 PROCEDURE — 94726 PLETHYSMOGRAPHY LUNG VOLUMES: CPT | Mod: S$GLB,,, | Performed by: INTERNAL MEDICINE

## 2017-12-05 PROCEDURE — 94060 EVALUATION OF WHEEZING: CPT | Mod: S$GLB,,, | Performed by: INTERNAL MEDICINE

## 2017-12-05 RX ORDER — ALBUTEROL SULFATE 0.83 MG/ML
2.5 SOLUTION RESPIRATORY (INHALATION) EVERY 4 HOURS PRN
Qty: 360 ML | Refills: 12 | Status: SHIPPED | OUTPATIENT
Start: 2017-12-05 | End: 2018-12-05

## 2017-12-05 RX ORDER — BUDESONIDE AND FORMOTEROL FUMARATE DIHYDRATE 160; 4.5 UG/1; UG/1
2 AEROSOL RESPIRATORY (INHALATION) EVERY 12 HOURS
Qty: 3 INHALER | Refills: 4 | Status: SHIPPED | OUTPATIENT
Start: 2017-12-05 | End: 2018-12-04 | Stop reason: SDUPTHER

## 2017-12-06 LAB
POST FEF 25 75: 1.38 L/S (ref 1.29–2.92)
POST FET 100: 6.88 S
POST FEV1 FVC: 76 %
POST FEV1: 1.57 L (ref 2.61–3.42)
POST FIF 50: 1.52 L/S
POST FVC: 2.06 L (ref 3.74–4.7)
POST PEF: 2.73 L/S (ref 6.37–8.74)
PRE DLCO: 12.42 ML/MMHG/MIN (ref 16.43–24.72)
PRE ERV: 0.38 L
PRE FEF 25 75: 1.81 L/S (ref 1.29–2.92)
PRE FET 100: 8.43 S
PRE FEV1 FVC: 79 %
PRE FEV1: 1.88 L (ref 2.61–3.42)
PRE FIF 50: 2.09 L/S
PRE FRC PL: 2.59 L (ref 3.14–4.35)
PRE FVC: 2.38 L (ref 3.74–4.7)
PRE KROGHS K: 4.06 1/MIN
PRE PEF: 4.48 L/S (ref 6.37–8.74)
PRE RV: 2.21 L (ref 2.37–3.16)
PRE SVC: 2.38 L
PRE TLC: 4.59 L (ref 6.11–7.11)
PREDICTED DLCO: 20.58 ML/MMHG/MIN (ref 16.43–24.72)
PREDICTED FEV1 FVC: 71.74 % (ref 66.91–76.58)
PREDICTED FEV1: 3.02 L (ref 2.61–3.42)
PREDICTED FRC N2: 3.75 L (ref 3.14–4.35)
PREDICTED FRC PL: 3.75 L (ref 3.14–4.35)
PREDICTED FVC: 4.22 L (ref 3.74–4.7)
PREDICTED RV: 2.76 L (ref 2.37–3.16)
PREDICTED SVC: 4.06 L
PREDICTED TLC: 6.61 L (ref 6.11–7.11)
PROVOCATION PROTOCOL: ABNORMAL

## 2017-12-07 ENCOUNTER — TELEPHONE (OUTPATIENT)
Dept: FAMILY MEDICINE | Facility: CLINIC | Age: 77
End: 2017-12-07

## 2017-12-07 NOTE — TELEPHONE ENCOUNTER
Pt states that he had a dm eye exam on 11/30/17 at the [facility]. They were the ones who told him that he would need cataract surgery.

## 2017-12-07 NOTE — TELEPHONE ENCOUNTER
Last [facility] exam received from [facility] is March 2015, pt is due for eye exam.  Would he like to schedule with [facility]?

## 2018-01-05 ENCOUNTER — HOSPITAL ENCOUNTER (OUTPATIENT)
Dept: RADIOLOGY | Facility: HOSPITAL | Age: 78
Discharge: HOME | End: 2018-01-05
Attending: NURSE PRACTITIONER
Payer: MEDICARE

## 2018-01-05 ENCOUNTER — OFFICE VISIT (OUTPATIENT)
Dept: URGENT CARE | Facility: CLINIC | Age: 78
End: 2018-01-05
Payer: MEDICARE

## 2018-01-05 VITALS
OXYGEN SATURATION: 94 % | TEMPERATURE: 99 F | DIASTOLIC BLOOD PRESSURE: 70 MMHG | HEIGHT: 71 IN | RESPIRATION RATE: 20 BRPM | WEIGHT: 193.88 LBS | BODY MASS INDEX: 27.14 KG/M2 | SYSTOLIC BLOOD PRESSURE: 150 MMHG | HEART RATE: 106 BPM

## 2018-01-05 DIAGNOSIS — R50.9 FEVER, UNSPECIFIED FEVER CAUSE: ICD-10-CM

## 2018-01-05 DIAGNOSIS — R05.9 COUGH: Primary | ICD-10-CM

## 2018-01-05 DIAGNOSIS — R05.9 COUGH: ICD-10-CM

## 2018-01-05 DIAGNOSIS — J20.8 ACUTE BRONCHITIS DUE TO OTHER SPECIFIED ORGANISMS: ICD-10-CM

## 2018-01-05 DIAGNOSIS — J02.9 SORE THROAT: ICD-10-CM

## 2018-01-05 DIAGNOSIS — R50.81 FEVER IN OTHER DISEASES: ICD-10-CM

## 2018-01-05 DIAGNOSIS — I10 ESSENTIAL HYPERTENSION: ICD-10-CM

## 2018-01-05 LAB
CTP QC/QA: YES
CTP QC/QA: YES
FLUAV AG NPH QL: NEGATIVE
FLUBV AG NPH QL: NEGATIVE
S PYO RRNA THROAT QL PROBE: NEGATIVE

## 2018-01-05 PROCEDURE — 87081 CULTURE SCREEN ONLY: CPT

## 2018-01-05 PROCEDURE — 71046 X-RAY EXAM CHEST 2 VIEWS: CPT | Mod: 26,,, | Performed by: RADIOLOGY

## 2018-01-05 PROCEDURE — 99499 UNLISTED E&M SERVICE: CPT | Mod: S$GLB,,, | Performed by: NURSE PRACTITIONER

## 2018-01-05 PROCEDURE — 87880 STREP A ASSAY W/OPTIC: CPT | Mod: QW,S$GLB,, | Performed by: NURSE PRACTITIONER

## 2018-01-05 PROCEDURE — 71046 X-RAY EXAM CHEST 2 VIEWS: CPT | Mod: TC,PO

## 2018-01-05 PROCEDURE — 87804 INFLUENZA ASSAY W/OPTIC: CPT | Mod: QW,S$GLB,, | Performed by: NURSE PRACTITIONER

## 2018-01-05 PROCEDURE — 99214 OFFICE O/P EST MOD 30 MIN: CPT | Mod: S$GLB,,, | Performed by: NURSE PRACTITIONER

## 2018-01-05 PROCEDURE — 99999 PR PBB SHADOW E&M-EST. PATIENT-LVL V: CPT | Mod: PBBFAC,,, | Performed by: NURSE PRACTITIONER

## 2018-01-05 RX ORDER — AZITHROMYCIN 250 MG/1
TABLET, FILM COATED ORAL
Qty: 6 TABLET | Refills: 0 | Status: SHIPPED | OUTPATIENT
Start: 2018-01-05 | End: 2018-01-10

## 2018-01-05 RX ORDER — PREDNISONE 20 MG/1
20 TABLET ORAL DAILY
Qty: 3 TABLET | Refills: 0 | Status: SHIPPED | OUTPATIENT
Start: 2018-01-05 | End: 2018-01-08

## 2018-01-05 NOTE — PATIENT INSTRUCTIONS
PLAN: CXR, POCT Strep & Influenza A & B  Advised increased p.o. fluids  Drink plenty of clear fluids--at least 64 ounces of water/juice & rest  Simply saline nasal wash or flonase to irrigate sinuses and for congestion/runny nose.  Advise monitor blood suger  Cool mist humidifier/vaporizer.  Meds: Zithromax,  prednisone / no refills  Practice good handwashing..  Mucinex dm for cough and chest congestion.  Tylenol  for fever, headache and body aches.  Warm salt water gargles for throat comfort.  Chloraseptic spray or lozenges for throat comfort.  Advise follow up with PCP  Advise go to ER if symptoms worsen or fail to improve with treatment.

## 2018-01-05 NOTE — PROGRESS NOTES
Chief complaint/reason for visit: Nasal congestion, postnasal drip, cough and fever    HISTORY OF PRESENT ILLNESS:   76 y/o male with family complains of sore throat, runny nose, nasal congestion, postnasal drip black mucus, headache, slight shortness of breath, productive cough  fever with body aches onset 1 week.  Patient complains cough worse at night.  Admits tried medication with no relief.  Denies seeking emergency room treatment.         Past Medical History:   Diagnosis Date    Allergy     Asbestosis(501)     Asthma     COPD (chronic obstructive pulmonary disease)     Diabetes mellitus     Diabetes mellitus, type 2     Hypertension     Neuropathic pain     Stroke 2015       Past Surgical History:   Procedure Laterality Date    BACK SURGERY      CATARACT EXTRACTION W/ INTRAOCULAR LENS IMPLANT              Family History   Problem Relation Age of Onset    Emphysema Sister     Cancer Sister     Heart failure Mother             Social History     Social History    Marital status:      Spouse name: N/A    Number of children: N/A    Years of education: N/A     Occupational History    Not on file.     Social History Main Topics    Smoking status: Former Smoker     Packs/day: 1.00     Types: Cigarettes     Quit date: 1998    Smokeless tobacco: Never Used    Alcohol use 0.0 oz/week      Comment: Beer but very seldom    Drug use: No    Sexual activity: Yes     Birth control/ protection: None     Other Topics Concern    Not on file     Social History Narrative    Lives alone, wife  2017.       ROS:  GENERAL: Reports  fever with body aches and fatigue.   SKIN: No rashes, itching or changes in color or texture of skin.  HEENT: Reports sore throat, runny nose, nasal congestion, postnasal drip black mucus, headache, hoarseness.   NODES: No masses or lesions. Denies swollen glands.  CHEST: Reports productive cough. & wheezing  CARDIOVASCULAR: Denies chest pain, shortness of  breath  ABDOMEN: Appetite fair, No weight loss.  MUSCULOSKELETAL: reports no back pain.  NEUROLOGIC: No history of seizures, paralysis, alteration of gait or coordination.  PSYCHIATRIC: Ayush mood swings, depression.    PE:   APPEARANCE: Well nourished, well developed, in moderate distress, hoarseness  V/S: Reviewed.  SKIN: Normal skin turgor, no lesions.  HEENT: Turbinates red, Minimal red pharynx, TM's poor light reflex bilateral.  CHEST: minimal expiratory wheezing with rhonchi on auscultation.  CARDIOVASCULAR: Regular rate and rhythm.   MUSCULOSKELETAL: GUERRERO without difficulty  NEUROLOGIC: No sensory deficits. Gait & Posture: normal, No cerebellar signs.  MENTAL STATUS: Patient alert, oriented x 3 & conversant.    PLAN: CXR, POCT Strep & Influenza A & B  Advised increased p.o. fluids  Drink plenty of clear fluids--at least 64 ounces of water/juice & rest  Simply saline nasal wash or Flonase to irrigate sinuses and for congestion/runny nose.  Advise monitor blood sugar  Cool mist humidifier/vaporizer.  Med's: Zithromax,  prednisone / no refills  Practice good handwashing..  Mucinex dm for cough and chest congestion.  Tylenol  for fever, headache and body aches.  Warm salt water gargles for throat comfort.  Chloraseptic spray or lozenges for throat comfort.  Advise follow up with PCP  Advise go to ER if symptoms worsen or fail to improve with treatment.      DIAGNOSIS:  Fever  Fatigue  Pharyngitis  Hypertension  Acute bacterial sinusitis  Bronchitis vs Pneumonia  chronic obstructive pulmonary disease  Uncontrolled type 2 diabetes mellitus with nephropathy

## 2018-01-08 LAB — BACTERIA THROAT CULT: NORMAL

## 2018-06-04 ENCOUNTER — OFFICE VISIT (OUTPATIENT)
Dept: FAMILY MEDICINE | Facility: CLINIC | Age: 78
End: 2018-06-04
Payer: MEDICARE

## 2018-06-04 ENCOUNTER — HOSPITAL ENCOUNTER (OUTPATIENT)
Dept: RADIOLOGY | Facility: HOSPITAL | Age: 78
Discharge: HOME | End: 2018-06-04
Attending: FAMILY MEDICINE
Payer: MEDICARE

## 2018-06-04 VITALS
DIASTOLIC BLOOD PRESSURE: 74 MMHG | OXYGEN SATURATION: 97 % | HEART RATE: 82 BPM | WEIGHT: 188.19 LBS | HEIGHT: 71 IN | SYSTOLIC BLOOD PRESSURE: 122 MMHG | TEMPERATURE: 97 F | BODY MASS INDEX: 26.35 KG/M2

## 2018-06-04 DIAGNOSIS — M54.41 ACUTE RIGHT-SIDED LOW BACK PAIN WITH RIGHT-SIDED SCIATICA: ICD-10-CM

## 2018-06-04 PROCEDURE — 99214 OFFICE O/P EST MOD 30 MIN: CPT | Mod: S$GLB,,, | Performed by: FAMILY MEDICINE

## 2018-06-04 PROCEDURE — 3078F DIAST BP <80 MM HG: CPT | Mod: CPTII,S$GLB,, | Performed by: FAMILY MEDICINE

## 2018-06-04 PROCEDURE — 3074F SYST BP LT 130 MM HG: CPT | Mod: CPTII,S$GLB,, | Performed by: FAMILY MEDICINE

## 2018-06-04 PROCEDURE — 99999 PR PBB SHADOW E&M-EST. PATIENT-LVL IV: CPT | Mod: PBBFAC,,, | Performed by: FAMILY MEDICINE

## 2018-06-04 PROCEDURE — 72100 X-RAY EXAM L-S SPINE 2/3 VWS: CPT | Mod: TC,PO

## 2018-06-04 PROCEDURE — 72100 X-RAY EXAM L-S SPINE 2/3 VWS: CPT | Mod: 26,,, | Performed by: RADIOLOGY

## 2018-06-04 RX ORDER — DICLOFENAC SODIUM 75 MG/1
75 TABLET, DELAYED RELEASE ORAL 2 TIMES DAILY
Qty: 20 TABLET | Refills: 0 | Status: SHIPPED | OUTPATIENT
Start: 2018-06-04

## 2018-06-04 NOTE — PROGRESS NOTES
Subjective:       Patient ID: Mikael Cancino is a 77 y.o. male.    Chief Complaint: Back Pain (referral to neuromedical center)    77 y old  Male with lower  back pain , burning pain irradiated to  r leg for 4 days  . He just woke up with it . He has had 2 back surgeries . They apparently  were diskectomy  In  1980 and 2005 by Dr Hernández  . Pain is worst with ambulation . Taking tylenol   And gabapentin  With no relief . No GI/ No  symtposm       Review of Systems   Constitutional: Negative.    HENT: Negative.    Eyes: Negative.    Respiratory: Negative.    Cardiovascular: Negative.    Gastrointestinal: Negative.    Genitourinary: Negative.    Musculoskeletal: Positive for arthralgias.   Skin: Negative.    Hematological: Negative.        Objective:      Physical Exam   Constitutional: He is oriented to person, place, and time. He appears well-developed and well-nourished. No distress.   HENT:   Head: Normocephalic and atraumatic.   Right Ear: External ear normal.   Left Ear: External ear normal.   Nose: Nose normal.   Mouth/Throat: No oropharyngeal exudate.   Eyes: Conjunctivae and EOM are normal. Pupils are equal, round, and reactive to light. Right eye exhibits no discharge. Left eye exhibits no discharge. No scleral icterus.   Neck: Normal range of motion. Neck supple. No JVD present. No tracheal deviation present. No thyromegaly present.   Cardiovascular: Normal rate, regular rhythm, normal heart sounds and intact distal pulses.  Exam reveals no gallop and no friction rub.    No murmur heard.  Pulmonary/Chest: Effort normal and breath sounds normal. No stridor. No respiratory distress. He has no wheezes. He has no rales. He exhibits no tenderness.   Abdominal: Soft. Bowel sounds are normal. He exhibits no distension. There is no tenderness. There is no rebound and no guarding.   Musculoskeletal: He exhibits no edema.        Lumbar back: He exhibits decreased range of motion, tenderness and bony tenderness.    Lymphadenopathy:     He has no cervical adenopathy.   Neurological: He is alert and oriented to person, place, and time. He has normal reflexes. He displays normal reflexes. No cranial nerve deficit. He exhibits normal muscle tone. Coordination normal.   Skin: Skin is warm and dry. No rash noted. He is not diaphoretic. No erythema. No pallor.   Psychiatric: He has a normal mood and affect. His behavior is normal. Judgment and thought content normal.       Assessment:     Mikael was seen today for back pain.    Diagnoses and all orders for this visit:    Acute right-sided low back pain with right-sided sciatica  -     X-Ray Lumbar Spine Ap And Lateral; Future  -     Ambulatory consult to Neurosurgery    Other orders  -     diclofenac (VOLTAREN) 75 MG EC tablet; Take 1 tablet (75 mg total) by mouth 2 (two) times daily.      Plan:   Pt will like to be refer out To [facility] center   WS discussed  We will continue to f.u

## 2018-06-05 ENCOUNTER — TELEPHONE (OUTPATIENT)
Dept: FAMILY MEDICINE | Facility: CLINIC | Age: 78
End: 2018-06-05

## 2018-06-05 NOTE — TELEPHONE ENCOUNTER
----- Message from Barbara Blue sent at 6/5/2018  8:57 AM CDT -----  Contact: pt  The pt request a call concerning a referral to Neuromedical, no additional info given, the pt can gabriela reached at [phone]///[id number]

## 2018-06-18 ENCOUNTER — TELEPHONE (OUTPATIENT)
Dept: FAMILY MEDICINE | Facility: CLINIC | Age: 78
End: 2018-06-18

## 2018-06-18 NOTE — TELEPHONE ENCOUNTER
Record received from [facility]. Pt diagnosed with : lumbosacral spondylosis without myelopathy and lumbar postlaminectomy syndrome.  Awaiting MRI results

## 2018-12-04 ENCOUNTER — HOSPITAL ENCOUNTER (OUTPATIENT)
Dept: RADIOLOGY | Facility: HOSPITAL | Age: 78
Discharge: HOME | End: 2018-12-04
Attending: INTERNAL MEDICINE
Payer: MEDICARE

## 2018-12-04 ENCOUNTER — CLINICAL SUPPORT (OUTPATIENT)
Dept: PULMONOLOGY | Facility: CLINIC | Age: 78
End: 2018-12-04
Payer: MEDICARE

## 2018-12-04 ENCOUNTER — OFFICE VISIT (OUTPATIENT)
Dept: PULMONOLOGY | Facility: CLINIC | Age: 78
End: 2018-12-04
Payer: MEDICARE

## 2018-12-04 VITALS
OXYGEN SATURATION: 99 % | BODY MASS INDEX: 25.67 KG/M2 | HEIGHT: 71 IN | SYSTOLIC BLOOD PRESSURE: 132 MMHG | WEIGHT: 183.38 LBS | DIASTOLIC BLOOD PRESSURE: 68 MMHG | RESPIRATION RATE: 18 BRPM | HEART RATE: 63 BPM

## 2018-12-04 DIAGNOSIS — J61 PULMONARY ASBESTOSIS: Primary | ICD-10-CM

## 2018-12-04 DIAGNOSIS — J44.9 CHRONIC OBSTRUCTIVE PULMONARY DISEASE, UNSPECIFIED COPD TYPE: ICD-10-CM

## 2018-12-04 PROBLEM — H25.812 COMBINED FORM OF AGE-RELATED CATARACT, LEFT EYE: Status: ACTIVE | Noted: 2018-04-13

## 2018-12-04 PROCEDURE — 99999 PR PBB SHADOW E&M-EST. PATIENT-LVL IV: CPT | Mod: PBBFAC,,, | Performed by: INTERNAL MEDICINE

## 2018-12-04 PROCEDURE — 99499 UNLISTED E&M SERVICE: CPT | Mod: S$GLB,,, | Performed by: INTERNAL MEDICINE

## 2018-12-04 PROCEDURE — 71048 X-RAY EXAM CHEST 4+ VIEWS: CPT | Mod: 26,,, | Performed by: RADIOLOGY

## 2018-12-04 PROCEDURE — 94726 PLETHYSMOGRAPHY LUNG VOLUMES: CPT | Mod: S$GLB,,, | Performed by: INTERNAL MEDICINE

## 2018-12-04 PROCEDURE — 71048 X-RAY EXAM CHEST 4+ VIEWS: CPT | Mod: TC

## 2018-12-04 PROCEDURE — 94729 DIFFUSING CAPACITY: CPT | Mod: S$GLB,,, | Performed by: INTERNAL MEDICINE

## 2018-12-04 PROCEDURE — 94060 EVALUATION OF WHEEZING: CPT | Mod: S$GLB,,, | Performed by: INTERNAL MEDICINE

## 2018-12-04 PROCEDURE — 1101F PT FALLS ASSESS-DOCD LE1/YR: CPT | Mod: CPTII,S$GLB,, | Performed by: INTERNAL MEDICINE

## 2018-12-04 PROCEDURE — 90662 IIV NO PRSV INCREASED AG IM: CPT | Mod: S$GLB,,, | Performed by: INTERNAL MEDICINE

## 2018-12-04 PROCEDURE — 3075F SYST BP GE 130 - 139MM HG: CPT | Mod: CPTII,S$GLB,, | Performed by: INTERNAL MEDICINE

## 2018-12-04 PROCEDURE — 3078F DIAST BP <80 MM HG: CPT | Mod: CPTII,S$GLB,, | Performed by: INTERNAL MEDICINE

## 2018-12-04 PROCEDURE — 99214 OFFICE O/P EST MOD 30 MIN: CPT | Mod: 25,S$GLB,, | Performed by: INTERNAL MEDICINE

## 2018-12-04 PROCEDURE — G0008 ADMIN INFLUENZA VIRUS VAC: HCPCS | Mod: S$GLB,,, | Performed by: INTERNAL MEDICINE

## 2018-12-04 RX ORDER — LISINOPRIL 20 MG/1
20 TABLET ORAL
COMMUNITY

## 2018-12-04 RX ORDER — TIOTROPIUM BROMIDE 18 UG/1
18 CAPSULE ORAL; RESPIRATORY (INHALATION) DAILY
Qty: 90 CAPSULE | Refills: 3 | Status: SHIPPED | OUTPATIENT
Start: 2018-12-04

## 2018-12-04 RX ORDER — TIOTROPIUM BROMIDE 18 UG/1
18 CAPSULE ORAL; RESPIRATORY (INHALATION)
COMMUNITY
End: 2018-12-04 | Stop reason: SDUPTHER

## 2018-12-04 RX ORDER — BUDESONIDE AND FORMOTEROL FUMARATE DIHYDRATE 160; 4.5 UG/1; UG/1
2 AEROSOL RESPIRATORY (INHALATION) EVERY 12 HOURS
Qty: 3 INHALER | Refills: 4 | Status: SHIPPED | OUTPATIENT
Start: 2018-12-04 | End: 2019-12-04

## 2018-12-04 RX ORDER — PHENYLEPHRINE HCL 10 MG
500 TABLET ORAL
COMMUNITY

## 2018-12-04 NOTE — PROGRESS NOTES
Subjective:       Patient ID: Mikael Cancino is a 78 y.o. male.    Chief Complaint: He       Pulmonary asbestosis    HPI     Dyspnea  Patient complains of shortness of breath. Symptoms occur after one flight stairs, with one block walking. Symptoms began 10 years ago, gradually worsening since. Associated symptoms include difficulty breathing, dyspnea on exertion and shortness of breath. He denies chest pain, located left chest. He does not have had recent travel. Weight has been stable. Symptoms are exacerbated by any exercise. Symptoms are alleviated by rest . Still weak in right arm and leg due to stroke - uses lift chair    Past Medical History:   Diagnosis Date    Allergy     Asbestosis(501)     Asthma     COPD (chronic obstructive pulmonary disease)     Diabetes mellitus     Diabetes mellitus, type 2     Hypertension     Neuropathic pain     Stroke 2015     Past Surgical History:   Procedure Laterality Date    BACK SURGERY      CATARACT EXTRACTION W/ INTRAOCULAR LENS IMPLANT      EYE SURGERY       Social History     Socioeconomic History    Marital status:      Spouse name: Not on file    Number of children: Not on file    Years of education: Not on file    Highest education level: Not on file   Social Needs    Financial resource strain: Not on file    Food insecurity - worry: Not on file    Food insecurity - inability: Not on file    Transportation needs - medical: Not on file    Transportation needs - non-medical: Not on file   Occupational History    Not on file   Tobacco Use    Smoking status: Former Smoker     Packs/day: 1.00     Types: Cigarettes     Last attempt to quit: 1998     Years since quittin.8    Smokeless tobacco: Never Used   Substance and Sexual Activity    Alcohol use: Yes     Alcohol/week: 0.0 oz     Comment: Beer but very seldom    Drug use: No    Sexual activity: Yes     Birth control/protection: None   Other Topics Concern    Not on file    Social History Narrative    Lives alone, wife  2017.     Review of Systems   Constitutional: Positive for fatigue. Negative for fever.   HENT: Positive for postnasal drip, rhinorrhea and congestion.    Eyes: Negative for redness and itching.   Respiratory: Positive for cough, sputum production, shortness of breath, dyspnea on extertion, use of rescue inhaler and Paroxysmal Nocturnal Dyspnea.    Cardiovascular: Negative for chest pain, palpitations and leg swelling.   Genitourinary: Negative for difficulty urinating and hematuria.   Endocrine: Negative for cold intolerance and heat intolerance.    Skin: Negative for rash.   Gastrointestinal: Negative for nausea and abdominal pain.   Neurological: Negative for dizziness, syncope, weakness and light-headedness.   Hematological: Negative for adenopathy. Does not bruise/bleed easily.   Psychiatric/Behavioral: Negative for sleep disturbance. The patient is not nervous/anxious.        Objective:      Physical Exam   Constitutional: He is oriented to person, place, and time. He appears well-developed and well-nourished.   HENT:   Head: Normocephalic and atraumatic.   Mouth/Throat: Oropharyngeal exudate present.   Eyes: Conjunctivae are normal. Pupils are equal, round, and reactive to light.   Neck: Neck supple. No JVD present. No tracheal deviation present. No thyromegaly present.   Cardiovascular: Normal rate, regular rhythm and normal heart sounds.   No murmur heard.  Pulmonary/Chest: Effort normal. No respiratory distress. He has decreased breath sounds. He has no wheezes. He has no rhonchi. He has rales in the right lower field and the left lower field. He exhibits no tenderness.   Abdominal: Soft. Bowel sounds are normal.   Musculoskeletal: Normal range of motion. He exhibits no edema or tenderness.   Lymphadenopathy:     He has no cervical adenopathy.   Neurological: He is alert and oriented to person, place, and time.   Skin: Skin is warm and dry.    Nursing note and vitals reviewed.    Personal Diagnostic Review  Chest x-ray: stable interstitial fibrosis  Pulmonary function tests: mod restriction  Office Spirometry Results:     No flowsheet data found.  Pulmonary Studies Review 12/4/2018   SpO2 99   Height 70.900   Weight 2934.4   BMI (Calculated) 25.7   Predicted Distance 287.62   Predicted Distance Meters (Calculated) 506.34         Assessment:       1. Pulmonary asbestosis    2. Chronic obstructive pulmonary disease, unspecified COPD type        Outpatient Encounter Medications as of 12/4/2018   Medication Sig Dispense Refill    acetaminophen (TYLENOL) 500 mg Cap       albuterol (PROVENTIL) 2.5 mg /3 mL (0.083 %) nebulizer solution Take 3 mLs (2.5 mg total) by nebulization every 4 (four) hours as needed for Shortness of Breath. 360 mL 12    amlodipine (NORVASC) 10 MG tablet Take 10 mg by mouth once daily.       blood sugar diagnostic (ACCU-CHEK ISABEL PLUS TEST STRP) Strp       budesonide-formoterol 160-4.5 mcg (SYMBICORT) 160-4.5 mcg/actuation HFAA Inhale 2 puffs into the lungs every 12 (twelve) hours. Wash out mouth after using 3 Inhaler 4    fluticasone (FLONASE) 50 mcg/actuation nasal spray 2 sprays by Nasal route.      insulin aspart (NOVOLOG) 100 unit/mL injection 5 units  Sq  Before dinner 10 mL 0    insulin NPH (NOVOLIN N) 100 unit/mL injection 45 units daily 10 mL 0    losartan (COZAAR) 100 MG tablet Take 100 mg by mouth every evening.       methocarbamol (ROBAXIN) 750 MG Tab Take 750 mg by mouth nightly.      mirtazapine (REMERON) 15 MG tablet Take 15 mg by mouth every evening.      [DISCONTINUED] budesonide-formoterol 160-4.5 mcg (SYMBICORT) 160-4.5 mcg/actuation HFAA Inhale 2 puffs into the lungs every 12 (twelve) hours. Wash out mouth after using 3 Inhaler 4    aspirin (ECOTRIN) 81 MG EC tablet Take 1 tablet (81 mg total) by mouth once daily. 30 tablet 0    cinnamon bark 500 mg capsule Take 500 mg by mouth.      diclofenac  (VOLTAREN) 75 MG EC tablet Take 1 tablet (75 mg total) by mouth 2 (two) times daily. 20 tablet 0    gabapentin (NEURONTIN) 300 MG capsule Take 600 mg by mouth 3 (three) times daily. 2 tablets by mouth 3 times daily      lisinopril (PRINIVIL,ZESTRIL) 20 MG tablet Take 20 mg by mouth.      tiotropium (SPIRIVA) 18 mcg inhalation capsule Inhale 1 capsule (18 mcg total) into the lungs once daily. 90 capsule 3    [DISCONTINUED] tiotropium (SPIRIVA) 18 mcg inhalation capsule Inhale 18 mcg into the lungs.       No facility-administered encounter medications on file as of 12/4/2018.      Orders Placed This Encounter   Procedures    X-Ray Chest 4 Or More View     Standing Status:   Future     Standing Expiration Date:   12/5/2019     Order Specific Question:   Reason for Exam:     Answer:   asbestosis    Influenza - High Dose (65+) (PF) (IM)    Complete PFT with bronchodilator     Standing Status:   Future     Standing Expiration Date:   6/4/2020     Plan:       Requested Prescriptions     Signed Prescriptions Disp Refills    budesonide-formoterol 160-4.5 mcg (SYMBICORT) 160-4.5 mcg/actuation HFAA 3 Inhaler 4     Sig: Inhale 2 puffs into the lungs every 12 (twelve) hours. Wash out mouth after using    tiotropium (SPIRIVA) 18 mcg inhalation capsule 90 capsule 3     Sig: Inhale 1 capsule (18 mcg total) into the lungs once daily.     Pulmonary asbestosis  -     budesonide-formoterol 160-4.5 mcg (SYMBICORT) 160-4.5 mcg/actuation HFAA; Inhale 2 puffs into the lungs every 12 (twelve) hours. Wash out mouth after using  Dispense: 3 Inhaler; Refill: 4  -     tiotropium (SPIRIVA) 18 mcg inhalation capsule; Inhale 1 capsule (18 mcg total) into the lungs once daily.  Dispense: 90 capsule; Refill: 3  -     X-Ray Chest 4 Or More View; Future; Expected date: 12/04/2018  -     Complete PFT with bronchodilator; Future; Expected date: 12/04/2018    Chronic obstructive pulmonary disease, unspecified COPD type  -      budesonide-formoterol 160-4.5 mcg (SYMBICORT) 160-4.5 mcg/actuation HFAA; Inhale 2 puffs into the lungs every 12 (twelve) hours. Wash out mouth after using  Dispense: 3 Inhaler; Refill: 4  -     tiotropium (SPIRIVA) 18 mcg inhalation capsule; Inhale 1 capsule (18 mcg total) into the lungs once daily.  Dispense: 90 capsule; Refill: 3    Other orders  -     Influenza - High Dose (65+) (PF) (IM)           Follow-up in about 1 year (around 12/4/2019) for Review PFT and CXR.    MEDICAL DECISION MAKING: Moderate to high complexity.  Overall, the multiple problems listed are of moderate to high severity that may impact quality of life and activities of daily living. Side effects of medications, treatment plan as well as options and alternatives reviewed and discussed with patient. There was counseling of patient concerning these issues.    Total time spent in face to face counseling and coordination of care - 25  minutes over 50% of time was used in discussion of prognosis, risks, benefits of treatment, instructions and compliance with regimen . Discussion with other physicians or health care providers (DME, NP, pharmacy, respiratory therapy) occurred.

## 2018-12-06 LAB
BRPFT: ABNORMAL
DLCO ADJ PRE: 12.23 ML/(MIN*MMHG) (ref 19.01–32.87)
DLCO SINGLE BREATH LLN: 19.01
DLCO SINGLE BREATH PRE REF: 47.1 %
DLCO SINGLE BREATH REF: 25.94
DLCOC SBVA LLN: 2.45
DLCOC SBVA PRE REF: 123.4 %
DLCOC SBVA REF: 3.55
DLCOC SINGLE BREATH LLN: 19.01
DLCOC SINGLE BREATH PRE REF: 47.1 %
DLCOC SINGLE BREATH REF: 25.94
DLCOVA LLN: 2.45
DLCOVA PRE REF: 123.4 %
DLCOVA PRE: 4.39 ML/(MIN*MMHG*L) (ref 2.45–4.66)
DLCOVA REF: 3.55
DLVAADJ PRE: 4.39 ML/(MIN*MMHG*L) (ref 2.45–4.66)
ERV LLN: 0.95
ERV PRE REF: 47.2 %
ERV REF: 0.95
ERVN2 LLN: 0.95
ERVN2 REF: 0.95
FEF 25 75 CHG: 2.4 %
FEF 25 75 LLN: 0.78
FEF 25 75 POST REF: 75.4 %
FEF 25 75 PRE REF: 73.7 %
FEF 25 75 REF: 2.07
FET100 CHG: 4.8 %
FEV1 CHG: -1.4 %
FEV1 FVC CHG: 3.5 %
FEV1 FVC LLN: 60
FEV1 FVC POST REF: 105.7 %
FEV1 FVC PRE REF: 102.2 %
FEV1 FVC REF: 74
FEV1 LLN: 2.05
FEV1 POST REF: 59 %
FEV1 PRE REF: 59.8 %
FEV1 REF: 2.96
FEV6 CHG: -3.5 %
FEV6 LLN: 2.97
FEV6 POST REF: 56.6 %
FEV6 POST: 2.21 L (ref 2.97–4.83)
FEV6 PRE REF: 58.7 %
FEV6 PRE: 2.29 L (ref 2.97–4.83)
FEV6 REF: 3.9
FRCN2 LLN: 2.85
FRCN2 REF: 3.84
FRCPLETH LLN: 2.85
FRCPLETH PREREF: 81.9 %
FRCPLETH REF: 3.84
FVC CHG: -4.7 %
FVC LLN: 2.89
FVC POST REF: 55.2 %
FVC PRE REF: 58 %
FVC REF: 4.01
IVC PRE: 1.57 L (ref 2.89–5.12)
IVC SINGLE BREATH LLN: 2.89
IVC SINGLE BREATH PRE REF: 39.2 %
IVC SINGLE BREATH REF: 4.01
MVV LLN: 100
MVV PRE REF: 40.1 %
MVV REF: 117
PEF CHG: -4.1 %
PEF LLN: 5.06
PEF POST REF: 44.6 %
PEF PRE REF: 46.5 %
PEF REF: 7.43
POST FEF 25 75: 1.56 L/S (ref 0.78–3.35)
POST FET 100: 8.92 SEC
POST FEV1 FVC: 78.76 % (ref 59.75–89.24)
POST FEV1: 1.74 L (ref 2.05–3.86)
POST FVC: 2.21 L (ref 2.89–5.12)
POST PEF: 3.32 L/S (ref 5.06–9.81)
PRE DLCO: 12.23 ML/(MIN*MMHG) (ref 19.01–32.87)
PRE ERV: 0.45 L (ref 0.95–0.95)
PRE FEF 25 75: 1.52 L/S (ref 0.78–3.35)
PRE FET 100: 8.51 SEC
PRE FEV1 FVC: 76.1 % (ref 59.75–89.24)
PRE FEV1: 1.77 L (ref 2.05–3.86)
PRE FRC PL: 3.15 L
PRE FVC: 2.32 L (ref 2.89–5.12)
PRE MVV: 47 L/MIN (ref 99.62–134.78)
PRE PEF: 3.46 L/S (ref 5.06–9.81)
PRE RV: 1.91 L (ref 2.21–3.56)
PRE TLC: 4.24 L (ref 6.15–8.45)
RAW LLN: 3.06
RAW PRE REF: 49.4 %
RAW PRE: 1.51 CMH2O*S/L (ref 3.06–3.06)
RAW REF: 3.06
RV LLN: 2.21
RV PRE REF: 66.3 %
RV REF: 2.89
RVN2 LLN: 2.21
RVN2 REF: 2.89
RVN2TLCN2 LLN: 36
RVN2TLCN2 REF: 45
RVTLC LLN: 36
RVTLC PRE REF: 100.1 %
RVTLC PRE: 45.18 % (ref 36.18–54.14)
RVTLC REF: 45
TLC LLN: 6.15
TLC PRE REF: 58 %
TLC REF: 7.3
TLCN2 LLN: 6.15
TLCN2 REF: 7.3
VA PRE: 2.79 L (ref 7.15–7.15)
VA SINGLE BREATH LLN: 7.15
VA SINGLE BREATH PRE REF: 38.9 %
VA SINGLE BREATH REF: 7.15
VC LLN: 2.89
VC PRE REF: 58 %
VC PRE: 2.32 L (ref 2.89–5.12)
VC REF: 4.01
VCMAXN2 LLN: 2.89
VCMAXN2 REF: 4.01
VTGRAWPRE: 3.47 L

## 2019-08-08 ENCOUNTER — PATIENT OUTREACH (OUTPATIENT)
Dept: ADMINISTRATIVE | Facility: HOSPITAL | Age: 79
End: 2019-08-08

## 2022-06-14 NOTE — PROGRESS NOTES
Subjective:       Patient ID: Mikael Cancino is a 77 y.o. male.    Chief Complaint: He       Pulmonary Asbestosis    HPI     Dyspnea  Patient complains of shortness of breath. Symptoms occur after one flight stairs, with one block walking. Symptoms began 10 years ago, gradually worsening since. Associated symptoms include difficulty breathing, dyspnea on exertion and shortness of breath. He denies chest pain, located left chest. He does not have had recent travel. Weight has been stable. Symptoms are exacerbated by any exercise. Symptoms are alleviated by rest . 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  First exposure: -  Last expsoure:   Afterward worked as   Welding very little  Sandblasting none  Toxic Fume Exposure: none      Past Medical History:   Diagnosis Date    Allergy     Asbestosis(501)     Asthma     COPD (chronic obstructive pulmonary disease)     Diabetes mellitus     Diabetes mellitus, type 2     Hypertension     Neuropathic pain     Stroke 2015     Past Surgical History:   Procedure Laterality Date    BACK SURGERY      CATARACT EXTRACTION W/ INTRAOCULAR LENS IMPLANT       Social History     Social History    Marital status:      Spouse name: N/A    Number of children: N/A    Years of education: N/A     Occupational History    Not on file.     Social History Main Topics    Smoking status: Former Smoker     Packs/day: 1.00     Types: Cigarettes     Quit date: 1998    Smokeless tobacco: Never Used    Alcohol use 0.0 oz/week      Comment: Beer but very seldom    Drug use: No    Sexual activity: Yes     Birth control/ protection: None     Other Topics Concern    Not on file     Social History Narrative    Lives alone, wife  2017.      Review of Systems   Constitutional: Positive for fatigue. Negative for fever.   HENT: Positive for postnasal drip, rhinorrhea and congestion.    Eyes: Negative for redness and itching.   Respiratory: Positive for cough, sputum production, shortness of breath, dyspnea on extertion, use of rescue inhaler and Paroxysmal Nocturnal Dyspnea.    Cardiovascular: Negative for chest pain, palpitations and leg swelling.   Genitourinary: Negative for difficulty urinating and hematuria.   Endocrine: Negative for cold intolerance and heat intolerance.    Skin: Negative for rash.   Gastrointestinal: Negative for nausea and abdominal pain.   Neurological: Positive for weakness. Negative for dizziness, syncope and light-headedness.   Hematological: Negative for adenopathy. Does not bruise/bleed easily.   Psychiatric/Behavioral: Negative for sleep disturbance. The patient is not nervous/anxious.        Objective:      Physical Exam   Constitutional: He is oriented to person, place, and time. He appears well-developed and well-nourished.   HENT:   Head: Normocephalic and atraumatic.   Mouth/Throat: Oropharyngeal exudate present.   Eyes: Conjunctivae are normal. Pupils are equal, round, and reactive to light.   Neck: Neck supple. No JVD present. No tracheal deviation present. No thyromegaly present.   Cardiovascular: Normal rate, regular rhythm and normal heart sounds.    No murmur heard.  Pulmonary/Chest: Effort normal. No respiratory distress. He has decreased breath sounds. He has no wheezes. He has no rhonchi. He has rales in the right lower field and the left lower field. He exhibits no tenderness.   Abdominal: Soft. Bowel sounds are normal.   Musculoskeletal: Normal range of motion. He exhibits no edema or tenderness.   Lymphadenopathy:     He has no cervical adenopathy.   Neurological: He is alert and oriented to person, place, and time. He displays abnormal reflex.   Skin: Skin is warm and dry.   Nursing note and vitals  reviewed.    Personal Diagnostic Review  Chest x-ray: stable interstitial changes  Pulmonary Function Studies : moderate [id number]    No flowsheet data found.      Assessment:       1. Pulmonary asbestosis    2. Chronic obstructive pulmonary disease, unspecified COPD type        Outpatient Encounter Prescriptions as of 12/5/2017   Medication Sig Dispense Refill    acetaminophen (TYLENOL) 500 mg Cap       albuterol (PROVENTIL) 2.5 mg /3 mL (0.083 %) nebulizer solution Take 3 mLs (2.5 mg total) by nebulization every 4 (four) hours as needed for Shortness of Breath. 360 mL 12    amlodipine (NORVASC) 10 MG tablet Take 10 mg by mouth once daily.       aspirin (ECOTRIN) 81 MG EC tablet Take 1 tablet (81 mg total) by mouth once daily. 30 tablet 0    budesonide-formoterol 160-4.5 mcg (SYMBICORT) 160-4.5 mcg/actuation HFAA Inhale 2 puffs into the lungs every 12 (twelve) hours. Wash out mouth after using 3 Inhaler 4    fluticasone (FLONASE) 50 mcg/actuation nasal spray 2 sprays by Nasal route.      gabapentin (NEURONTIN) 300 MG capsule Take 600 mg by mouth 3 (three) times daily. 2 tablets by mouth 3 times daily      insulin aspart (NOVOLOG) 100 unit/mL injection 5 units  Sq  Before dinner 10 mL 0    insulin NPH (NOVOLIN N) 100 unit/mL injection 45 units daily 10 mL 0    losartan (COZAAR) 100 MG tablet Take 100 mg by mouth every evening.       methocarbamol (ROBAXIN) 750 MG Tab Take 750 mg by mouth nightly.      mirtazapine (REMERON) 15 MG tablet Take 15 mg by mouth every evening.      [DISCONTINUED] albuterol (PROVENTIL) 2.5 mg /3 mL (0.083 %) nebulizer solution Take 3 mLs (2.5 mg total) by nebulization every 4 (four) hours as needed for Shortness of Breath. 360 mL 12    [DISCONTINUED] budesonide-formoterol 160-4.5 mcg (SYMBICORT) 160-4.5 mcg/actuation HFAA Inhale 2 puffs into the lungs every 12 (twelve) hours. Wash out mouth after using 1 Inhaler 12    [DISCONTINUED] tiotropium (SPIRIVA) 18 mcg inhalation  capsule Inhale 1 capsule (18 mcg total) into the lungs Daily. 30 capsule 12    umeclidinium 62.5 mcg/actuation DsDv Inhale 1 puff into the lungs once daily. 90 each 4    [DISCONTINUED] atorvastatin (LIPITOR) 40 MG tablet Take 1 tablet (40 mg total) by mouth once daily. Call PCP for refills (Patient taking differently: Take 80 mg by mouth once daily. Call PCP for refills) 30 tablet 0    [DISCONTINUED] benzonatate (TESSALON) 100 MG capsule Take 1 capsule (100 mg total) by mouth 3 (three) times daily as needed for Cough. 30 capsule 1    [DISCONTINUED] clonazepam (KLONOPIN) 1 MG tablet Take 1 mg by mouth every evening.      [DISCONTINUED] metformin (GLUCOPHAGE) 500 MG tablet Take 500 mg by mouth once daily.      gabapentin capsule 600 mg       [DISCONTINUED] acetaminophen tablet 650 mg       [DISCONTINUED] albuterol-ipratropium 2.5mg-0.5mg/3mL nebulizer solution 3 mL       [DISCONTINUED] amlodipine tablet 5 mg       [DISCONTINUED] arformoterol nebulizer solution 15 mcg       [DISCONTINUED] aspirin EC tablet 81 mg       [DISCONTINUED] dextrose 50% injection 12.5 g       [DISCONTINUED] dextrose 50% injection 25 g       [DISCONTINUED] glucagon (human recombinant) injection 1 mg       [DISCONTINUED] glucose chewable tablet 16 g       [DISCONTINUED] glucose chewable tablet 24 g       [DISCONTINUED] guaifenesin 12 hr tablet 600 mg       [DISCONTINUED] heparin (porcine) injection 5,000 Units       [DISCONTINUED] insulin aspart pen 0-5 Units       [DISCONTINUED] insulin NPH injection 20 Units       [DISCONTINUED] losartan tablet 100 mg        No facility-administered encounter medications on file as of 12/5/2017.      No orders of the defined types were placed in this encounter.    Plan:       Requested Prescriptions     Signed Prescriptions Disp Refills    budesonide-formoterol 160-4.5 mcg (SYMBICORT) 160-4.5 mcg/actuation HFAA 3 Inhaler 4     Sig: Inhale 2 puffs into the lungs every 12 (twelve) hours.  Wash out mouth after using    albuterol (PROVENTIL) 2.5 mg /3 mL (0.083 %) nebulizer solution 360 mL 12     Sig: Take 3 mLs (2.5 mg total) by nebulization every 4 (four) hours as needed for Shortness of Breath.    umeclidinium 62.5 mcg/actuation DsDv 90 each 4     Sig: Inhale 1 puff into the lungs once daily.     Pulmonary asbestosis    Chronic obstructive pulmonary disease, unspecified COPD type  -     budesonide-formoterol 160-4.5 mcg (SYMBICORT) 160-4.5 mcg/actuation HFAA; Inhale 2 puffs into the lungs every 12 (twelve) hours. Wash out mouth after using  Dispense: 3 Inhaler; Refill: 4  -     albuterol (PROVENTIL) 2.5 mg /3 mL (0.083 %) nebulizer solution; Take 3 mLs (2.5 mg total) by nebulization every 4 (four) hours as needed for Shortness of Breath.  Dispense: 360 mL; Refill: 12  -     umeclidinium 62.5 mcg/actuation DsDv; Inhale 1 puff into the lungs once daily.  Dispense: 90 each; Refill: 4           No Follow-up on file.    MEDICAL DECISION MAKING: Moderate to high complexity.  Overall, the multiple problems listed are of moderate to high severity that may impact quality of life and activities of daily living. Side effects of medications, treatment plan as well as options and alternatives reviewed and discussed with patient. There was counseling of patient concerning these issues.    Total time spent in face to face counseling and coordination of care - 30  minutes over 50% of time was used in discussion of prognosis, risks, benefits of treatment, instructions and compliance with regimen . Discussion with other physicians or health care providers (DME, NP, pharmacy, respiratory therapy) occurred.   Calcipotriene Counseling:  I discussed with the patient the risks of calcipotriene including but not limited to erythema, scaling, itching, and irritation.